# Patient Record
Sex: FEMALE | Race: WHITE | Employment: OTHER | URBAN - METROPOLITAN AREA
[De-identification: names, ages, dates, MRNs, and addresses within clinical notes are randomized per-mention and may not be internally consistent; named-entity substitution may affect disease eponyms.]

---

## 2017-04-20 ENCOUNTER — HOSPITAL ENCOUNTER (OUTPATIENT)
Dept: MAMMOGRAPHY | Age: 72
Discharge: HOME | End: 2017-04-20
Attending: FAMILY MEDICINE
Payer: MEDICARE

## 2017-04-20 DIAGNOSIS — Z78.0 POSTMENOPAUSAL: ICD-10-CM

## 2017-04-20 PROCEDURE — 77080 DXA BONE DENSITY AXIAL: CPT

## 2020-09-08 PROBLEM — Z80.8 FAMILY HISTORY OF THYROID CANCER: Status: ACTIVE | Noted: 2020-09-08

## 2021-01-25 ENCOUNTER — HOSPITAL ENCOUNTER (OUTPATIENT)
Dept: SURGERY | Age: 76
Discharge: HOME | End: 2021-01-25

## 2021-01-27 VITALS — WEIGHT: 170 LBS | HEIGHT: 64 IN | BODY MASS INDEX: 29.02 KG/M2

## 2021-01-27 NOTE — PERIOP NOTES
Patient verified name, , and procedure. Type: 1a; abbreviated assessment per anesthesia guidelines  Labs per surgeon: none  Labs per anesthesia: none    Patient COVID test date- unknown- no appt in EMR; Patient states she was not given an appt. The testing center is located at the [address]. If appointment is needed- patient provided telephone number of [phone]- pt states she will call today. Pt with reported hx of afib and MVP- most recent [facility]iology consult note states:   - Echo - normal SF, impaired relaxation, NORMAL MV, no MVP, no valvular regurgitation   - Echo report from [facility] in [place] - normal echocardiogram, no MVP  Normal nuclear stress test    Palpitations related to benign isolated ectopy well controlled with BB, no evidence of MVP and no reports of atrial fib. Patient notes the doctor who \"diagnosed\" afib never did any testing and barely even examined her. We have requested those records but records from her second opinion are reviewed, she had a pretty extensive evaluation there. Instructed pt that they will be notified by the doctor office for time of arrival to GI lab. If any questions please call the GI lab at [phone]. Follow diet and prep instructions per office. May have clear liquids until 4 hours prior to time of arrival.    [place] Roxo or shower the night before and the am of surgery with antibacterial soap. No lotions, oils, powders, cologne on skin. No make up, eye make up or jewelry. Wear loose fitting comfortable, clean clothing. Must have adult present in building the entire time . Medications for the day of procedure- celexa, bentyl PRN, synthroid, metoprolol, prilosec, imitrex PRN. Pt to hold mobic x 5 days prior to surgery.      The following discharge instructions reviewed with patient: medication given during procedure may cause drowsiness for several hours, therefore, do not drive or operate machinery for remainder of the day, no alcohol on the day of your procedure, resume regular diet and activity unless otherwise directed, for mild sore throat you may use Cepacol throat lozenges or warm salt water gargles as needed, call your physician for any problems or questions. Patient verbalizes understanding.

## 2021-02-13 ENCOUNTER — ANESTHESIA EVENT (OUTPATIENT)
Dept: ENDOSCOPY | Age: 76
End: 2021-02-13
Payer: MEDICARE

## 2021-02-13 RX ORDER — FENTANYL CITRATE 50 UG/ML
100 INJECTION, SOLUTION INTRAMUSCULAR; INTRAVENOUS AS NEEDED
Status: CANCELLED | OUTPATIENT
Start: 2021-02-13

## 2021-02-13 RX ORDER — MIDAZOLAM HYDROCHLORIDE 1 MG/ML
2 INJECTION, SOLUTION INTRAMUSCULAR; INTRAVENOUS
Status: CANCELLED | OUTPATIENT
Start: 2021-02-13 | End: 2021-02-14

## 2021-02-15 ENCOUNTER — HOSPITAL ENCOUNTER (OUTPATIENT)
Age: 76
Setting detail: OUTPATIENT SURGERY
Discharge: HOME | End: 2021-02-15
Attending: SURGERY | Admitting: SURGERY
Payer: MEDICARE

## 2021-02-15 ENCOUNTER — ANESTHESIA (OUTPATIENT)
Dept: ENDOSCOPY | Age: 76
End: 2021-02-15
Payer: MEDICARE

## 2021-02-15 VITALS
RESPIRATION RATE: 16 BRPM | HEART RATE: 80 BPM | TEMPERATURE: 98.6 F | BODY MASS INDEX: 29.59 KG/M2 | DIASTOLIC BLOOD PRESSURE: 59 MMHG | WEIGHT: 173.3 LBS | HEIGHT: 64 IN | OXYGEN SATURATION: 93 % | SYSTOLIC BLOOD PRESSURE: 130 MMHG

## 2021-02-15 DIAGNOSIS — K64.8 INTERNAL AND EXTERNAL HEMORRHOIDS WITHOUT COMPLICATION: ICD-10-CM

## 2021-02-15 DIAGNOSIS — K64.4 INTERNAL AND EXTERNAL HEMORRHOIDS WITHOUT COMPLICATION: ICD-10-CM

## 2021-02-15 DIAGNOSIS — K21.9 GASTROESOPHAGEAL REFLUX DISEASE WITHOUT ESOPHAGITIS: ICD-10-CM

## 2021-02-15 DIAGNOSIS — K57.30 DIVERTICULOSIS OF COLON (WITHOUT MENTION OF HEMORRHAGE): ICD-10-CM

## 2021-02-15 PROCEDURE — 74011250636 HC RX REV CODE- 250/636: Performed by: ANESTHESIOLOGY

## 2021-02-15 PROCEDURE — 88305 TISSUE EXAM BY PATHOLOGIST: CPT

## 2021-02-15 PROCEDURE — 87081 CULTURE SCREEN ONLY: CPT

## 2021-02-15 PROCEDURE — 76040000026: Performed by: SURGERY

## 2021-02-15 PROCEDURE — 76060000032 HC ANESTHESIA 0.5 TO 1 HR: Performed by: SURGERY

## 2021-02-15 PROCEDURE — 45378 DIAGNOSTIC COLONOSCOPY: CPT | Performed by: SURGERY

## 2021-02-15 PROCEDURE — 74011250637 HC RX REV CODE- 250/637: Performed by: ANESTHESIOLOGY

## 2021-02-15 PROCEDURE — 74011000250 HC RX REV CODE- 250: Performed by: NURSE ANESTHETIST, CERTIFIED REGISTERED

## 2021-02-15 PROCEDURE — 77030021593 HC FCPS BIOP ENDOSC BSC -A: Performed by: SURGERY

## 2021-02-15 PROCEDURE — 74011000250 HC RX REV CODE- 250: Performed by: ANESTHESIOLOGY

## 2021-02-15 PROCEDURE — 74011250636 HC RX REV CODE- 250/636: Performed by: NURSE ANESTHETIST, CERTIFIED REGISTERED

## 2021-02-15 PROCEDURE — 88312 SPECIAL STAINS GROUP 1: CPT

## 2021-02-15 PROCEDURE — 2709999900 HC NON-CHARGEABLE SUPPLY: Performed by: SURGERY

## 2021-02-15 PROCEDURE — 43239 EGD BIOPSY SINGLE/MULTIPLE: CPT | Performed by: SURGERY

## 2021-02-15 RX ORDER — ONDANSETRON 2 MG/ML
4 INJECTION INTRAMUSCULAR; INTRAVENOUS ONCE
Status: DISCONTINUED | OUTPATIENT
Start: 2021-02-15 | End: 2021-02-15 | Stop reason: HOSPADM

## 2021-02-15 RX ORDER — PROPOFOL 10 MG/ML
INJECTION, EMULSION INTRAVENOUS
Status: DISCONTINUED | OUTPATIENT
Start: 2021-02-15 | End: 2021-02-15 | Stop reason: HOSPADM

## 2021-02-15 RX ORDER — OXYCODONE HYDROCHLORIDE 5 MG/1
5 TABLET ORAL
Status: DISCONTINUED | OUTPATIENT
Start: 2021-02-15 | End: 2021-02-15 | Stop reason: HOSPADM

## 2021-02-15 RX ORDER — PROPOFOL 10 MG/ML
INJECTION, EMULSION INTRAVENOUS AS NEEDED
Status: DISCONTINUED | OUTPATIENT
Start: 2021-02-15 | End: 2021-02-15 | Stop reason: HOSPADM

## 2021-02-15 RX ORDER — METOPROLOL SUCCINATE 25 MG/1
50 TABLET, EXTENDED RELEASE ORAL ONCE
Status: COMPLETED | OUTPATIENT
Start: 2021-02-15 | End: 2021-02-15

## 2021-02-15 RX ORDER — NALOXONE HYDROCHLORIDE 0.4 MG/ML
0.1 INJECTION, SOLUTION INTRAMUSCULAR; INTRAVENOUS; SUBCUTANEOUS AS NEEDED
Status: DISCONTINUED | OUTPATIENT
Start: 2021-02-15 | End: 2021-02-15 | Stop reason: HOSPADM

## 2021-02-15 RX ORDER — ONDANSETRON 2 MG/ML
INJECTION INTRAMUSCULAR; INTRAVENOUS AS NEEDED
Status: DISCONTINUED | OUTPATIENT
Start: 2021-02-15 | End: 2021-02-15 | Stop reason: HOSPADM

## 2021-02-15 RX ORDER — OXYCODONE HYDROCHLORIDE 5 MG/1
10 TABLET ORAL
Status: DISCONTINUED | OUTPATIENT
Start: 2021-02-15 | End: 2021-02-15 | Stop reason: HOSPADM

## 2021-02-15 RX ORDER — LIDOCAINE HYDROCHLORIDE 10 MG/ML
0.1 INJECTION INFILTRATION; PERINEURAL AS NEEDED
Status: DISCONTINUED | OUTPATIENT
Start: 2021-02-15 | End: 2021-02-15 | Stop reason: HOSPADM

## 2021-02-15 RX ORDER — HYDROMORPHONE HYDROCHLORIDE 2 MG/ML
0.5 INJECTION, SOLUTION INTRAMUSCULAR; INTRAVENOUS; SUBCUTANEOUS
Status: DISCONTINUED | OUTPATIENT
Start: 2021-02-15 | End: 2021-02-15 | Stop reason: HOSPADM

## 2021-02-15 RX ORDER — PANTOPRAZOLE SODIUM 40 MG/1
40 TABLET, DELAYED RELEASE ORAL 2 TIMES DAILY
Qty: 84 TAB | Refills: 0 | Status: SHIPPED | OUTPATIENT
Start: 2021-02-15 | End: 2021-04-05

## 2021-02-15 RX ORDER — SODIUM CHLORIDE, SODIUM LACTATE, POTASSIUM CHLORIDE, CALCIUM CHLORIDE 600; 310; 30; 20 MG/100ML; MG/100ML; MG/100ML; MG/100ML
75 INJECTION, SOLUTION INTRAVENOUS CONTINUOUS
Status: DISCONTINUED | OUTPATIENT
Start: 2021-02-15 | End: 2021-02-15 | Stop reason: HOSPADM

## 2021-02-15 RX ORDER — SODIUM CHLORIDE, SODIUM LACTATE, POTASSIUM CHLORIDE, CALCIUM CHLORIDE 600; 310; 30; 20 MG/100ML; MG/100ML; MG/100ML; MG/100ML
1000 INJECTION, SOLUTION INTRAVENOUS CONTINUOUS
Status: DISCONTINUED | OUTPATIENT
Start: 2021-02-15 | End: 2021-02-15 | Stop reason: HOSPADM

## 2021-02-15 RX ORDER — LIDOCAINE HYDROCHLORIDE 20 MG/ML
INJECTION, SOLUTION EPIDURAL; INFILTRATION; INTRACAUDAL; PERINEURAL AS NEEDED
Status: DISCONTINUED | OUTPATIENT
Start: 2021-02-15 | End: 2021-02-15 | Stop reason: HOSPADM

## 2021-02-15 RX ADMIN — LIDOCAINE HYDROCHLORIDE 0.1 ML: 10 INJECTION, SOLUTION INFILTRATION; PERINEURAL at 07:29

## 2021-02-15 RX ADMIN — ONDANSETRON 4 MG: 2 INJECTION INTRAMUSCULAR; INTRAVENOUS at 08:22

## 2021-02-15 RX ADMIN — LIDOCAINE HYDROCHLORIDE 60 MG: 20 INJECTION, SOLUTION EPIDURAL; INFILTRATION; INTRACAUDAL; PERINEURAL at 07:52

## 2021-02-15 RX ADMIN — SODIUM CHLORIDE, SODIUM LACTATE, POTASSIUM CHLORIDE, AND CALCIUM CHLORIDE: 600; 310; 30; 20 INJECTION, SOLUTION INTRAVENOUS at 08:05

## 2021-02-15 RX ADMIN — PROPOFOL 70 MG: 10 INJECTION, EMULSION INTRAVENOUS at 07:52

## 2021-02-15 RX ADMIN — METOPROLOL SUCCINATE 50 MG: 25 TABLET, EXTENDED RELEASE ORAL at 07:43

## 2021-02-15 RX ADMIN — SODIUM CHLORIDE, SODIUM LACTATE, POTASSIUM CHLORIDE, AND CALCIUM CHLORIDE 500 ML: 600; 310; 30; 20 INJECTION, SOLUTION INTRAVENOUS at 07:30

## 2021-02-15 RX ADMIN — PROPOFOL 200 MCG/KG/MIN: 10 INJECTION, EMULSION INTRAVENOUS at 07:52

## 2021-02-15 NOTE — OP NOTES
Esophagogastroduodenoscopy and Colonoscopy Procedure Note    Date of procedure: 2/15/2021      Name: Lubna Lindquist      MRN: [id number]       : 1945       Age: 70 y.o. Sex: female      Preoperative Diagnosis: 1. GERD      2. Regurgitation      3. History of polyps    Postoperative Diagnosis: 1. same      2. Sliding hiatal hernia      3. Gastritis with antral erosions      4. Diverticulosis      5. Internal and External hemorrhoids    Procedure(s):  ESOPHAGOGASTRODUEDENOSCOPY  COLONOSCOPY/ BMI 30  ESOPHAGOGASTRODUODENAL (EGD) BIOPSY [facility] [address], [id number]    EGD Procedure in Detail:  Informed consent was obtained for the procedure, the patient was brought to the endoscopy suite and placed in left lateral decubitus position. Sedation was induced by anesthesia. The [id number] gastroscope was inserted into the mouth and advanced under direct vision to second portion of the duodenum. A careful inspection was made as the gastroscope was withdrawn, including a retroflexed view of the proximal stomach; findings and interventions are described below, with appropriate photodocumentation obtained. Findings:   Oropharynx:   Normal  Esophagus:       - Sliding hiatal hernia 2-3 cm  GEJ 37 cm  Cardia of the stomach:    Normal  Body of the stomach:      - Flat lesions:     - mild gastritis  Fundus of the stomach:    Normal  Antrum of the stomach:      - Excavated lesions:     - Erosions    - Flat lesions:     - moderate gastritis  Duodenum:    Normal        Therapies:      biopsy of stomach body, antrum, pre pyloric antrum    EBL-  minimal    Specimens: Specimens were collected and sent to pathology.   ID Type Source Tests Collected by Time Destination   1 : gastric bxs Preservative   Mildred Ro MD 2/15/2021 0801 Pathology   1 : jose raul test Tissue Gastric H PYLORI, TISSUE (LAB) Mildred Ro MD 2/15/2021 8774 Microbiology               Complications:   None; patient tolerated the procedure well.           Recommendations:  - Acid suppression with a proton pump inhibitor.  - Await pathology. - Await JOSE RAUL test result and treat for Helicobacter pylori if positive. Colonoscopy Procedure in Detail:  The bed was turn around and patient continued in left lateral decubitus position. The [id number] was inserted into the rectum and advanced under direct vision to the cecum, which was identified by the ileocecal valve and appendiceal orifice. The quality of the colonic preparation was good. A careful inspection was made as the colonoscope was withdrawn, including a retroflexed view of the rectum; findings and interventions are described below. Appropriate photodocumentation was obtained. Findings:   Rectum:     - Protruding lesions:     -External Hemorrhoids and     -Internal Hemorrhoids  Sigmoid:     - Excavated lesions:     - Diverticulosis  Descending Colon:     - Excavated lesions:     - Diverticulosis  Transverse Colon:   Normal  Ascending Colon:   Normal  Cecum:   Normal  Terminal Ileum:   Not entered    Specimens: No specimens were collected. ID Type Source Tests Collected by Time Destination   1 : gastric bxs Preservative   Mahad Mensah MD 2/15/2021 0801 Pathology   1 : jose raul test Tissue Gastric H PYLORI, TISSUE (LAB) Mahad Mensah MD 2/15/2021 6728 Microbiology        Complications: None; patient tolerated the procedure well. EBL - minimal    Recommendations:   - Repeat colonoscopy in 5 years.      Signed By: Moises Brower MD  [address] [facility]  2/15/2021 8:25 AM

## 2021-02-15 NOTE — DISCHARGE INSTRUCTIONS
Gastrointestinal Colonoscopy/Flexible Sigmoidoscopy - Lower Exam Discharge Instructions  1. Call Dr. Courtney Mills at office for any problems or questions. 2. Contact the doctors office for follow up appointment as directed  3. Medication may cause drowsiness for several hours, therefore:  · Do not drive or operate machinery for reminder of the day. · No alcohol today. · Do not make any important or legal decisions for 24 hours. · Do not sign any legal documents for 24 hours. 4. Ordinarily, you may resume regular diet and activity after exam unless otherwise specified by your physician. 5. Because of air put into your colon during exam, you may experience some abdominal distension, relieved by the passage of gas, for several hours. 6. Contact your physician if you have any of the following:  · Excessive amount of bleeding - large amount when having a bowel movement. Occasional specks of blood with bowel movement would not be unusual.  · Severe abdominal pain  · Fever or Chills    Any additional instructions:  ***      Instructions given to Maisha Martin and other family members. Gastrointestinal Esophagogastroduodenoscopy (EGD) - Upper Exam Discharge Instructions    1. Call Dr. Courtney Mills at office for any problems or questions. 2. Contact the doctor's office for follow up appointment as directed. 3. Medication may cause drowsiness for several hours, therefore:  · Do not drive or operate machinery for remainder of the day. · No alcohol today. · Do not make any important or legal decisions for 24 hours. · Do not sign any legal documents for 24 hours. 5. Ordinarily, you may resume regular diet and activity after exam unless otherwise specified by your physician. 6. For mild soreness in your throat you may use Cepacol throat lozenges or warm salt-water gargles as needed. Any additional instructions:  ***     Instructions given to Maisha Martin and other family members.

## 2021-02-15 NOTE — ANESTHESIA PREPROCEDURE EVALUATION
Relevant Problems   NEUROLOGY   (+) Depression with anxiety      CARDIOVASCULAR   (+) Essential hypertension      GASTROINTESTINAL   (+) Esophageal reflux   (+) GERD (gastroesophageal reflux disease)      ENDOCRINE   (+) Primary hypothyroidism       Anesthetic History               Review of Systems / Medical History  Patient summary reviewed and pertinent labs reviewed    Pulmonary  Within defined limits                 Neuro/Psych         Headaches and psychiatric history     Cardiovascular    Hypertension        Dysrhythmias : atrial fibrillation           GI/Hepatic/Renal     GERD           Endo/Other      Hypothyroidism       Other Findings              Physical Exam    Airway  Mallampati: II  TM Distance: 4 - 6 cm  Neck ROM: normal range of motion   Mouth opening: Normal     Cardiovascular    Rhythm: regular  Rate: normal         Dental  No notable dental hx       Pulmonary  Breath sounds clear to auscultation               Abdominal  GI exam deferred       Other Findings            Anesthetic Plan    ASA: 2  Anesthesia type: total IV anesthesia            Anesthetic plan and risks discussed with: Patient

## 2021-02-15 NOTE — H&P
Axel De MD   Bariatric & Advanced Laparoscopic Surgery & Endoscopy  [address] [place] [address]  Gautam Strauss  Phone [phone]   Fax [phone]      Date of visit: 2/15/2021      Primary/Requesting provider: Mary Ann Pierre MD         Name: Raghu Jose      MRN: [id number]       : 1945       Age: 70 y.o. Sex: female        PCP: Mary Ann Pierre MD     CC:    No chief complaint on file. HPI:     Raghu Jose is a 70 y.o. female was referred here to our clinic for evaluation for an EGD/colonoscopy by PCP. Last colonoscopy was in 2016 and was found with 6 polyps. She reports occasional diarrhea for the last 8 years. She has history of gastric ulcer and GERD about 20 years ago. She takes Nexium daily. She is gluten and lactose intolerant. She repors regurgitation but denies dysphagia. No smoking. GERD YES      Dysphagia NO   Regurgitation YES   Nausea NO   Vomiting NO   Upper abdominal pain NO          Family hx of colon cancer NO      Previous colonoscopy YES   BRBPR NO   Melena NO   Loss of appetite NO   Weight loss NO      Change in bowel habits NO       PMH:    Past Medical History:   Diagnosis Date    Atrial fibrillation ([address]     per cardio note 2020: \"Palpitations related to benign isolated ectopy well controlled with BB, no evidence of MVP and no reports of atrial fib. \"'    Depression with anxiety     Diverticulosis of colon     Essential hypertension     GERD (gastroesophageal reflux disease)     Hashimoto's thyroiditis     Hypercholesterolemia     Migraine     Mitral valve prolapse     2019 Echo report from [facility] in [place] - \"normal echocardiogram, no MVP\"    Multiple thyroid nodules     Peptic ulcer of stomach     Postmenopausal     Primary hypothyroidism     Spider telangiectasis        PSH:    Past Surgical History:   Procedure Laterality Date    HX APPENDECTOMY  age 27   [employer] BREAST BIOPSY      HX BREAST RECONSTRUCTION Bilateral 1990    HX CHOLECYSTECTOMY  2008    HX COLONOSCOPY      HX ENDOSCOPY  3/2016    HX HEENT  06/2014    sinus surgery    HX HERNIA REPAIR  age 41    HX HYSTERECTOMY  age 30       MEDS:    Current Facility-Administered Medications   Medication    lidocaine (XYLOCAINE) 10 mg/mL (1 %) injection 0.1 mL    lactated Ringers infusion 1,000 mL       ALLERGIES:      Allergies   Allergen Reactions    Gluten Nausea Only    Milk Containing Products Nausea Only       SH:    Social History     Tobacco Use    Smoking status: Never Smoker    Smokeless tobacco: Never Used   Substance Use Topics    Alcohol use: No    Drug use: No       FH:    Family History   Problem Relation Age of Onset    Lung Cancer Father     Broken Bones Mother     Heart Disease Mother     Breast Cancer Maternal Cousin     Breast Cancer Maternal Aunt     Thyroid Cancer Maternal Cousin        Review of systems:  Review of Systems   Constitutional: Positive for malaise/fatigue. Negative for chills, fever and weight loss. HENT: Negative for sore throat. Eyes: Negative for discharge and redness. Respiratory: Negative for cough, hemoptysis, sputum production, shortness of breath and stridor. Cardiovascular: Negative. Negative for chest pain, palpitations, orthopnea, claudication, leg swelling and PND. Gastrointestinal: Positive for abdominal pain and diarrhea. Negative for blood in stool, constipation, heartburn, melena, nausea and vomiting. Genitourinary: Negative for dysuria and hematuria. Musculoskeletal: Negative for back pain, falls and joint pain. Skin: Negative for itching and rash. Neurological: Negative for sensory change, speech change, focal weakness, loss of consciousness and headaches. Endo/Heme/Allergies: Does not bruise/bleed easily. Psychiatric/Behavioral: Negative. Negative for depression, hallucinations, memory loss, substance abuse and suicidal ideas.  The patient is not nervous/anxious and does not have insomnia. Physical Exam:     Visit Vitals  /89   Pulse 87   Temp 98.3 °F (36.8 °C)   Resp 16   Ht 5' 4\" (1.626 m)   Wt 173 lb 4.8 oz (78.6 kg)   SpO2 96%   BMI 29.75 kg/m²       General:  Well-developed, well-nourished, no distress. Psych:  Cooperative, good insight and judgement. Neuro:  Alert, oriented to person, place and time. HEENT:  Normocephalic, atraumatic. Sclera clear. Lungs:  Unlabored breathing. Symmetrical chest expansion. Chest wall:  No tenderness or deformity. Heart:  Regular rate and rhythm. No JVD. Abdomen:  Soft, non-tender, non-distended. No palpable masses. Extremities:  Extremities normal, atraumatic, no cyanosis or edema. Skin:  Skin color, texture, turgor normal. No rashes. Labs: All recent labs were reviewed. Normal WBC. Normal CBC. Normal lytes. Assessment:  Kaleb Christie is a 70 y.o. female was referred here for an EGD/colonoscopy. Recommendations:     1. Schedule for EGD/colonoscopy. The risks, benefits, alternatives, and consequences were reviewed with the patient, who expressed verbal understanding and agreement to proceed.          Signed: Elliot Nolen MD  Bariatric & Minimally Invasive Surgery  [facility]  2/15/2021

## 2021-02-15 NOTE — ANESTHESIA POSTPROCEDURE EVALUATION
Procedure(s):  ESOPHAGOGASTRODUEDENOSCOPY  COLONOSCOPY/ BMI 30  ESOPHAGOGASTRODUODENAL (EGD) BIOPSY.     total IV anesthesia    Anesthesia Post Evaluation      Multimodal analgesia: multimodal analgesia used between 6 hours prior to anesthesia start to PACU discharge  Patient location during evaluation: bedside  Patient participation: complete - patient participated  Level of consciousness: responsive to verbal stimuli  Pain management: adequate  Airway patency: patent  Anesthetic complications: no  Cardiovascular status: hemodynamically stable  Respiratory status: spontaneous ventilation  Hydration status: stable    Final Post Anesthesia Temperature Assessment:  Normothermia (36.0-37.5 degrees C)      INITIAL Post-op Vital signs:   Vitals Value Taken Time   /59 02/15/21 0844   Temp 37 °C (98.6 °F) 02/15/21 0829   Pulse 80 02/15/21 0844   Resp 16 02/15/21 0844   SpO2 93 % 02/15/21 0844

## 2021-02-16 LAB
H PYLORI AG TISS QL IMSTN: NEGATIVE
H PYLORI AG TISS QL IMSTN: NEGATIVE

## 2021-02-17 NOTE — PROGRESS NOTES
Please call patient about pathology results. H pylori was negative. PPI sent x 6 weeks for gastritis. No colon polyps. Colon recall in 5 years due to previous history of polyps.      Mir Oconnell MD  Bariatric & Minimally Invasive Surgery  [facility]  2/17/2021 7:52 AM

## 2021-07-15 PROBLEM — N39.3 STRESS INCONTINENCE: Status: ACTIVE | Noted: 2021-07-15

## 2021-07-15 PROBLEM — N95.2 VAGINAL ATROPHY: Status: ACTIVE | Noted: 2021-07-15

## 2021-07-15 PROBLEM — N81.10 VAGINAL PROLAPSE: Status: ACTIVE | Noted: 2021-07-15

## 2021-08-18 ENCOUNTER — HOSPITAL ENCOUNTER (OUTPATIENT)
Dept: PHYSICAL THERAPY | Age: 76
Discharge: HOME | End: 2021-08-18
Payer: MEDICARE

## 2021-08-18 DIAGNOSIS — N39.3 STRESS INCONTINENCE: ICD-10-CM

## 2021-08-18 PROCEDURE — 97161 PT EVAL LOW COMPLEX 20 MIN: CPT

## 2021-08-18 PROCEDURE — 97110 THERAPEUTIC EXERCISES: CPT

## 2021-08-18 NOTE — PROGRESS NOTES
Ray Aiken  : 1945  [place]  Secondary: [address] at [facility]  [address], [address], [place] [address]  [phone]   [phone]      OUTPATIENT PHYSICAL THERAPY: Daily Treatment Note 2021   Visit Count:  1  ICD-10: Treatment Diagnosis: Lack of coordination (muscle incoordination) (R27.8), Generalized weakness (M62.81), Mixed incontinence (Urge and stress incontinence) (N39.46) and Frequency of micturition (R35.0)  Precautions/Allergies:   Gluten and Milk containing products   TREATMENT PLAN:  Effective Dates: 2021 TO 2021 (90 days). Frequency/Duration: 1 time a week for 90 Day(s) MEDICAL/REFERRING DIAGNOSIS:  Stress incontinence [N39.3]   DATE OF ONSET: 3+ years  REFERRING PHYSICIAN: Laith Jj DO  MD Orders: Evaluate and treat  Return MD Appointment: Not scheduled/after PT     Pre-treatment Symptoms/Complaints:  JENNIFER  Pain: Initial: Pain Intensity 1: 0  Post Session:  0/10   Medications Last Reviewed:  2021  Updated Objective Findings:  See evaluation note from today   TREATMENT:   THERAPEUTIC EXERCISE: (10 minutes):  Exercises per grid below to improve strength and coordination. Required maximal verbal and tactile cues to promote proper body breathing techniques and minimze accessory muscle use. Progressed range and repetitions as indicated. TE= therapeutic exercise, TA= therapeutic activity, MT= manual therapy, NE= neuro re-education   Date:  21 Date:   Date:     Activity/Exercise Parameters Parameters Parameters   PFM coordination/strength Isolation of contractions with maintaining normal breathing     HEP 2H/5R x10, 3x/day             THERAPEUTIC ACTIVITY: ( 10 minutes): Functional activity education regarding anatomy, pathology and role of pelvic floor muscle (PFM) function in relation to presenting symptoms and role of pelvic floor therapy in conservative treatment.   TA Educational Topic Notes Date Completed   Pathology/Anatomy/PFM Function  8/18/21   Bladder health education     Urinary urge suppression     The knack     Voiding strategies     Bowel/Bladder log     Bowel health education     Constipation care     Diarrhea/Fecal leakage     Colonic massage     Toilet positioning     Defecation dynamics     Sources of fiber     Return to intercourse/Dilator training     Sexual positioning     Lubricants/vaginal moisturizers     Vaginal irritants     Body mechanics     Posture/ergonomics     Diaphragmatic breathing     Resources and technology       Pt gives verbal consent to internal vaginal assessment/treatment without chaperon present. Treatment/Session Summary:    · Response to Treatment:  Pt reports good understanding of plan of care, as well as prescribed home exercise program.  All questions were answered to pt's satisfaction. Pt was invited to call with any further questions or concerns. · Communication/Consultation:  None today  · Equipment provided today:  None today  · Recommendations/Intent for next treatment session: Next visit will focus on bladder health education, coordination, progression with quick contractions.   · Variation from POC: pt OOT 9/16-9/24  Total Treatment Billable Duration: Evaluation 30 minutes, treatment 20 minutes (10 minutes TE, 10 minutes TA)  PT Patient Time In/Time Out  Time In: 9368  Time Out: Nagi Wong Nix, PT, DPT     Future Appointments   Date Time Provider You Gillisi   8/25/2021  2:00 PM Chaz Eisenberg PT, DPT [facility]   9/1/2021  2:00 PM Chaz Eisenberg PT, DPT [facility]   9/8/2021 11:00 AM [facility]   9/8/2021  1:30 PM Yelena Mcneil MD END [facility]   9/15/2021 11:00 AM Chaz Eisenberg PT, DPT [facility]   9/29/2021 11:00 AM [facility]   10/6/2021 11:00 AM [facility]   12/3/2021  9:00 AM [facility]   12/10/2021  9:20 AM Naheed Meredith MD [facility]

## 2021-08-18 NOTE — THERAPY EVALUATION
Ki Valentin  : 1945  [place]  Secondary: [address] [facility], [address], [place] [address]  [phone]   [phone]        OUTPATIENT PHYSICAL THERAPY:Initial Assessment 2021   ICD-10: Treatment Diagnosis: Lack of coordination (muscle incoordination) (R27.8), Generalized weakness (M62.81), Mixed incontinence (Urge and stress incontinence) (N39.46) and Frequency of micturition (R35.0)  Precautions/Allergies:   Gluten and Milk containing products   TREATMENT PLAN:  Effective Dates: 2021 TO 2021 (90 days). Frequency/Duration: 1 time a week for 90 Day(s) MEDICAL/REFERRING DIAGNOSIS:  Stress incontinence [N39.3]   DATE OF ONSET: 3+ years  REFERRING PHYSICIAN: Gregg Agarwal DO  MD Orders: Evaluate and treat  Return MD Appointment: Not scheduled/after PT     INITIAL ASSESSMENT: Ki Valentin presents with musculoskeletal limitations including pelvic floor muscle (PFM) tension, reduced coordination and awareness of PFM, reduced hip strength, poor diaphragm excursion and decreased pelvic floor muscle (PFM) strength. These limitations are impairing the patient's ability to properly coordinate perineal elevation and descent for normalized PFM function, contributing to urinary dysfunction, including mixed urinary dysfunction and frequency of urination. As a result, she is limited in her/his ability to participate in maintaining normal bladder control throughout the day with urgency and activities with coughing, laughing sneezing and sit to stand transitions. PROBLEM LIST (Impacting functional limitations):  1. Decreased Strength  2. Decreased ADL/Functional Activities  3. Decreased Flexibility/Joint Mobility  4. Decreased [place] with Home Exercise Program  5.  Decreased strength of pelvic floor which limits bladder control INTERVENTIONS PLANNED: (Treatment may consist of any combination of the following)  1. Neuromuscular re-education  2. Biofeedback as needed  3. HEP  4. Bladder retraining  5. Bladder education  6. Bed Mobility  7. Electrical Stimulation  8. Home Exercise Program (HEP)  9. Manual Therapy  10. Neuromuscular Re-education/Strengthening  11. Therapeutic Activites  12. Therapeutic Exercise/Strengthening     GOALS: (Goals have been discussed and agreed upon with patient.)  Short-Term Functional Goals: Time Frame: 6 weeks  1. Pt will demonstrate I with basic PFM HEP to improve awareness, coordination, and timing of PFM. 2. Pt will demonstrate understanding of and ability to teach back two strategies to reduce constipation and improve toileting to minimize strain on the pelvic floor muscles. 3. Pt will demonstrate understanding of and ability to teach back appropriate water intake, bladder irritants, toileting frequency, and positioning for improved self-management of symptoms. 4. Pt will demonstrate ability to isolate a pelvic floor contraction without breath holding and minimal to no accessory muscle use in order to implement with the knack and/or urge suppression in order to reduce UI episodes by 50%. 5. Pt will demonstrate ability to perform diaphragmatic breathing in multiple positions to assist in pelvic floor tension reduction. Discharge Goals: Time Frame: 12 weeks  1. Pt will improve outcome score by 12%. 2. Pt will demonstrate ability to voluntarily contract the pelvic floor muscles prior to a cough or sneeze for decreased leakage during increases in intra-abdominal pressure. 3. Pt will be able to perform sit to stand without urinary leakage for improved participation in recreational activities and ADL's.   4. Pt will demonstrate independence with an advanced HEP for general conditioning, core stabilization, and mobility to facilitate carry over and independent management of symptoms.    5. Pt will be independent in implementation of a timed voiding schedule and use of urge suppression to reduce urinary frequency to 8/day and <2/night. OUTCOME MEASURE:   Tool Used: Pelvic Floor Impact Questionnaireshort form 7 (PFIQ-7)   Score:  Initial: 8/18/21  · Bladder or Urine: 28.57/100  · Bowel or Rectum: 0/100  · Vagina or Pelvis: 0/100 Most Recent: X (Date: -- )  · Bladder or Urine: X  · Bowel or Rectum: X  · Vagina or Pelvis: X   Interpretation of Score: Each of the 7 sections is scored on a scale from 0-3; 0 representing \"Not at all\", 3 representing \"Quite a bit\". The mean value is taken from all the answered items, then multiplied by 100 to obtain the scale score, ranging from 0-100. This process is repeated for each column representing bowel, bladder, and pelvic pain. MEDICAL NECESSITY:   · Patient is expected to demonstrate progress in strength, range of motion, coordination and functional technique to improve bladder control and reduce UI. REASON FOR SERVICES/OTHER COMMENTS:  · Patient continues to require skilled intervention due to above mentioned deficits. Total Duration:  PT Patient Time In/Time Out  Time In: 2657  Time Out: 1000    Rehabilitation Potential For Stated Goals: Good  Regarding Kendallcassie Danacassie Zakia's therapy, I certify that the treatment plan above will be carried out by a therapist or under their direction. Thank you for this referral,  Tez Mendieta, PT, DPT        PAIN/SUBJECTIVE:   Initial: Pain Intensity 1: 0  Post Session:  0/10   HISTORY:   History of Injury/Illness (Reason for Referral):  Ms. Margarita Nunez is a 71 yo female referred to pelvic floor physical therapy (PFPT) by Juana Peters,  2/2 stress urinary incontinence (JENNIFER). Pt reports that symptoms began 3+ years ago. Previous treatment includes nothing. Started topical estrogen about 3 weeks ago. And this has helped a lot. No longer having burning with she urinates, \"blisters\" are gone now. Pt with urinary leakage with coughing, laughing, sneezing.  She does leakage with urgency. Does have more control since starting topicals. Has also tried to drink mostly water. Urges are less intense. She is not wearing pads any longer. Urinary: Frequency 10 x/day, 2 x/night. Positive for stress urinary incontinence, urge urinary incontinence and urinary frequency. Negative for urinary urgency, incomplete emptying, urinary hesitancy/intermittency, dysuria, hematuria, nocturia and nocturnal enuresis. Pt does not use pads for urinary protection; 0 pads per day (PPD). She stopped wearing pads because of tissue irritation. \"I'm very bathroom conscious\". She does change her underwear 4x/week. Fluid intake: 48 oz water/day; bladder irritants include: 1c coffee. Pt does limit fluid intake due to bladder control, mostly at night time. Stops drinking around 6pm.    Bowel: Frequency daily. She does splint to evacuate bowels. Negative for pain with bowel movement, pushing/straining with bowel movement, fecal incontinence, constipation and incomplete emptying. Pt does not use pads for bowel protection; 0 pads per day (PPD). Use of stool softeners or laxatives? NO; takes Nury probiotic. She does have a history of diverticulitis. Sexual: Pt is not sexually active. Pelvic Organ Prolapse/Pelvic Pain: Denies pelvic pain, pressure, vaginal pain, bladder pain or rectal pain. OB History: Number of pregnancies: 2 Number of vaginal deliveries: 2 Number of c-sections: 0. Episiotomy x2    Patient stated goals for PT:  Patients goal(s) for PT are strengthening muscles in order to have better bladder control.     Past Medical History/Comorbidities:   Ms. César Fernando  has a past medical history of Atrial fibrillation ([facility] [address] Depression with anxiety, Diverticulosis of colon, Essential hypertension, GERD (gastroesophageal reflux disease), Hashimoto's thyroiditis, Hypercholesterolemia, Migraine, Mitral valve prolapse, Multiple thyroid nodules, Peptic ulcer of stomach, Postmenopausal, Primary hypothyroidism, and Spider telangiectasis. Ms. Terri Villagomez  has a past surgical history that includes hx breast reconstruction (Bilateral, 1990); hx hysterectomy (age 30); hx appendectomy (age 27); hx cholecystectomy (2008); hx hernia repair (age 41); hx endoscopy (3/2016); hx heent (06/2014); hx breast biopsy; hx colonoscopy; and colonoscopy (N/A, 2/15/2021). Social History/Living Environment:   Have you ever had any pelvic trauma (orthopedic in nature, fall, MVA, etc.)? NO   Have you ever experienced any unwanted physical or sexual contact? NO   Have you ever experienced any form of medical trauma (GYN, urological, GI, etc)? NO     Pt lives with her . Alcohol use? No  Tobacco use? No    Prior Level of Function/Work/Activity:  Prior level of function: Independent  Occupation: Part time, accounting  Exercise activities: None        Risk Factors:       (1)  Altered Elimination Ability to Rise from Chair:       (1)  Pushes up, successful in one attempt   Falls Prevention Plan:       No modifications necessary   Total: (5 or greater = High Risk): 2   ©2010 [facility] [address] Patent #[id number]. Federal Law prohibits the replication, distribution or use without written permission from [facility] [employer]   Current Medications:       Current Outpatient Medications:     OTHER, Estradiol 0.1mg + vitamin E 100IU, 30 grams Insert 0.5g into the vagina 2-3 times weekly  Compounding solutions, Disp: 30 g, Rfl: 5    pantoprazole (PROTONIX) 40 mg tablet, Take 1 Tablet by mouth daily. , Disp: 90 Tablet, Rfl: 3    metoprolol succinate (TOPROL-XL) 50 mg XL tablet, Take 1 Tablet by mouth daily. , Disp: 1 Tablet, Rfl: 0    hydroCHLOROthiazide (HYDRODIURIL) 25 mg tablet, Take 1 Tablet by mouth daily. , Disp: 90 Tablet, Rfl: 1    SUMAtriptan (Imitrex) 100 mg tablet, Take 1 Tablet by mouth as needed for Migraine. , Disp: 10 Tablet, Rfl: 6    folic acid/multivit-min/lutein (CENTRUM SILVER PO), Take  by mouth daily. , Disp: , Rfl:     mupirocin (BACTROBAN) 2 % ointment, Apply  to affected area daily. , Disp: 22 g, Rfl: 0    ALPRAZolam (XANAX) 1 mg tablet, Take 1 Tablet by mouth three (3) times daily as needed for Anxiety. Max Daily Amount: 3 mg., Disp: 90 Tablet, Rfl: 0    atorvastatin (LIPITOR) 20 mg tablet, Take 1 Tablet by mouth nightly for 90 days. , Disp: 9030 Tablet, Rfl: 2    levothyroxine (SYNTHROID) 50 mcg tablet, 1 tablet daily 6 days/week, Disp: 26 Tablet, Rfl: 11    meloxicam (MOBIC) 15 mg tablet, 1 tab daily, Disp: 90 Tab, Rfl: 3    citalopram (CELEXA) 20 mg tablet, Take 1 Tab by mouth daily. , Disp: 90 Tab, Rfl: 3    zonisamide (ZONEGRAN) 100 mg capsule, Take 1 Cap by mouth nightly., Disp: 90 Cap, Rfl: 3    dicyclomine (BENTYL) 20 mg tablet, Take 20 mg by mouth every six (6) hours as needed for Abdominal Cramps., Disp: , Rfl:     aspirin delayed-release 81 mg tablet, Take 81 mg by mouth daily. , Disp: , Rfl:     cholecalciferol, vitamin D3, (Vitamin D3) 50 mcg (2,000 unit) tab, Take  by mouth., Disp: , Rfl:    Date Last Reviewed: 8/18/2021   Number of Personal Factors/Comorbidities that affect the Plan of Care: 0: LOW COMPLEXITY   EXAMINATION:   External Observations:   Voluntary contraction: [] absent     [x] present   Involuntary contraction: [x] absent     [] present   Involuntary relaxation: [] absent     [x] present   Perineal descent at rest: [] absent     [x] present   Perineal descent with bearing: [] absent     [x] present   Skin integrity: WNL    Pelvic Floor Muscle (PFM) Assessment:   Vaginal vault size: [] decreased     [] increased     [x] WNL   Muscle volume: [] decreased     [x] WNL     [] Defect   PFM tension: [] decreased     [x] increased     [] WNL    Contraction ability:  Voluntary contraction: [] absent     [] weak     [x] moderate      [] strong  Voluntary relaxation: [] absent     [x] partial     [] complete; delayed   MMT: 3/5   PFM endurance: <3 seconds   Repetitions of endurance contractions: DNT due to poor coordination  Number of quick contractions in 10 seconds: DNT due to poor coordination  Quality of contractions: Fair with significant breath holding and abdominal compensation; with activation of abdominal increased decent of bladder    Tissue laxity test:  Anterior wall: [] minimum     [x] moderate     [] severe      [] WNL  Posterior wall: [x] minimum     [] moderate     [] severe      [] WNL    Palpation: via vaginal canal   Superficial Pelvic Floor Musculature (PFM): Tender? Intermediate PFM Tender? Deep PFM Tender? Superficial Transverse Perineal [] Right  [] Left  []DNT Deep Transverse Perineal [] Right  [] Left  []DNT Puborectalis [] Right  [] Left  []DNT   Ischiocavernosus [] Right  [] Left  []DNT Compressor Urethra [] Right  [] Left  []DNT Pubococcygeus [] Right  [] Left  []DNT   Bulbocavernosus [] Right  [] Left  []DNT   Iliococcygeus [] Right  [] Left  []DNT       Obturator Internus [] Right  [] Left  []DNT       Coccygeus [] Right  [] Left  []DNT     Breath assessment:  Observation: A/P chest expansion and lateral rib expansion  Coordination of pelvic floor muscles with breath: minimal pelvic floor muscle excursion  Co-contraction of PFM with transversus abdominis: present and noted visible decent of bladder with abdominal engagement      Body Structures Involved:  1. Nerves  2. Muscles  3. Ligaments Body Functions Affected:  1. Sensory/Pain  2. Genitourinary  3. Neuromusculoskeletal  4. Movement Related Activities and Participation Affected:  1. Learning and Applying Knowledge  2. General Tasks and Demands  3. Mobility  4. Self Care  5.  Community, Social and [place]   Number of elements (examined above) that affect the Plan of Care: 3: MODERATE COMPLEXITY   CLINICAL PRESENTATION:   Presentation: Stable and uncomplicated: LOW COMPLEXITY   CLINICAL DECISION MAKING:   Use of outcome tool(s) and clinical judgement create a POC that gives a: Clear prediction of patient's progress: LOW COMPLEXITY

## 2021-08-25 ENCOUNTER — HOSPITAL ENCOUNTER (OUTPATIENT)
Dept: PHYSICAL THERAPY | Age: 76
Discharge: HOME | End: 2021-08-25
Payer: MEDICARE

## 2021-08-25 PROCEDURE — 97530 THERAPEUTIC ACTIVITIES: CPT

## 2021-08-25 PROCEDURE — 97110 THERAPEUTIC EXERCISES: CPT

## 2021-08-25 PROCEDURE — 97140 MANUAL THERAPY 1/> REGIONS: CPT

## 2021-08-25 NOTE — PROGRESS NOTES
Arianne Ma  : 1945  [place]  Secondary: [address] at [facility]  [address], [address], [place] [address]  [phone]   [phone]      OUTPATIENT PHYSICAL THERAPY: Daily Treatment Note 2021   Visit Count:  2  ICD-10: Treatment Diagnosis: Lack of coordination (muscle incoordination) (R27.8), Generalized weakness (M62.81), Mixed incontinence (Urge and stress incontinence) (N39.46) and Frequency of micturition (R35.0)  Precautions/Allergies:   Gluten and Milk containing products   TREATMENT PLAN:  Effective Dates: 2021 TO 2021 (90 days). Frequency/Duration: 1 time a week for 90 Day(s) MEDICAL/REFERRING DIAGNOSIS:  Stress incontinence [N39.3]   DATE OF ONSET: 3+ years  REFERRING PHYSICIAN: Keenan Morin DO  MD Orders: Evaluate and treat  Return MD Appointment: Not scheduled/after PT     Pre-treatment Symptoms/Complaints:  JENNIFER    Did well yesterday in regards to urgency/frequency, but worse today due to bowel frequency after eating a large salad last night. Feels like she is isolating her contraction better with practice. Pain: Initial: Pain Intensity 1: 0  Post Session:  0/10   Medications Last Reviewed:  2021  Updated Objective Findings:  None Today   TREATMENT:   THERAPEUTIC EXERCISE: (25 minutes):  Exercises per grid below to improve mobility, strength and coordination. Required maximal verbal and tactile cues to promote proper body breathing techniques and minimze accessory muscle use. Progressed range and repetitions as indicated.   TE= therapeutic exercise, TA= therapeutic activity, MT= manual therapy, NE= neuro re-education   Date:  21 Date:  21 Date:     Activity/Exercise Parameters Parameters Parameters   PFM coordination/strength Isolation of contractions with maintaining normal breathing Holds and quick contractions with focus on coordination w/ digital palpation HEP 2H/5R x10, 3x/day     Diaphragmatic breathing  5 minutes    Adductor stretch  3x30s    Single knee to chest  3x30s B    Happy baby  3x30s            THERAPEUTIC ACTIVITY: ( 15 minutes): Functional activity education on avoiding bladder irritants, substitutions for common irritants, recommended fluid intake (types and spacing), appropriate voiding frequency, weight management and overall contributing factors of bowel health on bladder health/function in order to improve independent self management. Patient was provided a list of common bladder irritants including caffeine, carbonation, alcohol, artificial sweeteners, chocolate, acidic drinks, and tomato based drinks. TA Educational Topic Notes Date Completed   Pathology/Anatomy/PFM Function  8/18/21   Bladder health education  8/25/21   Urinary urge suppression     The ani     Voiding strategies     Bowel/Bladder log     Bowel health education     Constipation care     Diarrhea/Fecal leakage     Colonic massage     Toilet positioning     Defecation dynamics     Sources of fiber     Return to intercourse/Dilator training     Sexual positioning     Lubricants/vaginal moisturizers     Vaginal irritants     Body mechanics     Posture/ergonomics     Diaphragmatic breathing     Resources and technology       MANUAL THERAPY: (15 minutes): Soft tissue mobilization was utilized and necessary because of the patient's restricted motion of soft tissue.     Date Type Location Comments   8/25/2021 Internal assessment/treatment Via vaginal canal Single digital MT to all PFM layer to improve tissue mobility    STM adductors Skin rolling                                 (Used abbreviations: MET - muscle energy technique; SCS- Strain counter strain; CTM-Connective tissue mobilizations; CR- Contract/Relax; SP- Sustained pressure; PIT- Positional inhibition techniques; STM Soft -tissue mobilization; MM- Myofascial mobilization; TrP-Trigger point release; IASTM- Instrument assisted soft tissue mobilizations, TDN-Trigger point dry needling)    Pt gives verbal consent to internal vaginal assessment/treatment without chaperon present. Access Code: [email or web address]  URL: [email or web address]  Date: 08/25/2021  Prepared by: Rashid Hairston    Exercises  Supine Hip Adductor Stretch - 2 x daily - 7 x weekly - 3 reps - 30 hold  Single Knee to Chest Stretch - 2 x daily - 7 x weekly - 3 reps - 30 hold  Supine Pelvic Floor Stretch - Hands on Knees - 2 x daily - 7 x weekly - 3 reps - 30 hold    Treatment/Session Summary:    · Response to Treatment:  Patient with difficulty of isolation of pelvic floor contraction today, however this did improve with repetitions. She has inconsistent stability of endurance holds with occasional breath holding noted. Patient was instructed today in coordination and use of quick contractions which improved with practice. Patient will practice as part of a HEP in preparation for implementation in urge suppression at next appointment. Patient demonstrates global muscle tension and guarding. She states that she is a very tight person overall. She was instructed in diaphragmatic breathing and basic flexibility program in order to improve muscle tension and mobility of tissue. She demonstrates good understanding of contributing factors of muscle tension to pelvic floor dysfunction. She was also educated on bladder health and bladder norms in order to improve understanding and awareness of contributing factors to urgency and frequency symptoms. · Communication/Consultation:  None today  · Equipment provided today:  None today  · Recommendations/Intent for next treatment session: Next visit will focus on coordination of PFM, urge suppression, 'the knack'.   · Variation from POC: pt OOT 9/16-9/24  Total Treatment Billable Duration: 25 minutes TE, 15 minutes TA, 15 minutes MT  PT Patient Time In/Time Out  Time In: 5540  Time Out: 7968  Connor Leary, PT, DPT     Future Appointments   Date Time Provider You Molina   9/1/2021  2:00 PM Judit Ball, AMIRAT [facility]   9/8/2021 11:00 AM Jovani Donahue PT, DPT [facility]   9/8/2021  1:30 PM Humera Mcneil MD END BS ENDO   9/15/2021 11:00 AM [facility]   9/29/2021 11:00 AM [facility]   10/6/2021 11:00 AM Pinky Kerr PT, DPT [facility]   12/3/2021  9:00 AM CAFM LAB [facility] CAF   12/10/2021  9:20 AM Valente Self MD [facility]

## 2021-09-01 ENCOUNTER — HOSPITAL ENCOUNTER (OUTPATIENT)
Dept: PHYSICAL THERAPY | Age: 76
Discharge: HOME | End: 2021-09-01
Payer: MEDICARE

## 2021-09-01 PROCEDURE — 97140 MANUAL THERAPY 1/> REGIONS: CPT

## 2021-09-01 PROCEDURE — 97110 THERAPEUTIC EXERCISES: CPT

## 2021-09-01 PROCEDURE — 97530 THERAPEUTIC ACTIVITIES: CPT

## 2021-09-01 NOTE — PROGRESS NOTES
Sussy Soni  : 1945  [place]  Secondary: [address] at [facility]  [address], [address], [place] [address]  [phone]   [phone]      OUTPATIENT PHYSICAL THERAPY: Daily Treatment Note 2021   Visit Count:  3  ICD-10: Treatment Diagnosis: Lack of coordination (muscle incoordination) (R27.8), Generalized weakness (M62.81), Mixed incontinence (Urge and stress incontinence) (N39.46) and Frequency of micturition (R35.0)  Precautions/Allergies:   Gluten and Milk containing products   TREATMENT PLAN:  Effective Dates: 2021 TO 2021 (90 days). Frequency/Duration: 1 time a week for 90 Day(s) MEDICAL/REFERRING DIAGNOSIS:  Stress incontinence [N39.3]   DATE OF ONSET: 3+ years  REFERRING PHYSICIAN: Sarahi Carr DO  MD Orders: Evaluate and treat  Return MD Appointment: Not scheduled/after PT     Pre-treatment Symptoms/Complaints:  JENNIFER    Doing well with exercises \"I've only missed one morning\". Some soreness with stretching, but feels that will improve as she practices more. Urinary leakage is getting better. \"I'm not wearing a pad and I'm getting to the point where when I cough or stand up I'm not leaking. \"    Pain: Initial: Pain Intensity 1: 0  Post Session:  0/10   Medications Last Reviewed:  2021  Updated Objective Findings:  moderate abdominal use with hold kegels   TREATMENT:   THERAPEUTIC EXERCISE: (15 minutes):  Exercises per grid below to improve mobility, strength and coordination. Required moderate verbal and tactile cues to promote proper body breathing techniques and minimze accessory muscle use. Progressed range and repetitions as indicated.   TE= therapeutic exercise, TA= therapeutic activity, MT= manual therapy, NE= neuro re-education   Date:  21 Date:  21 Date:  21   Activity/Exercise Parameters Parameters Parameters   PFM coordination/strength Isolation of contractions with maintaining normal breathing Holds and quick contractions with focus on coordination w/ digital palpation Holds and quick contractions with focus on coordination w/ digital palpation   HEP 2H/5R x10, 3x/day     Diaphragmatic breathing  5 minutes    Adductor stretch  3x30s    Single knee to chest  3x30s B    Happy baby  3x30s    Lumbar rotation   x10 w/ feet wide, add to HEP     THERAPEUTIC ACTIVITY: (25 minutes): Functional activity education on avoiding bladder irritants, substitutions for common irritants, recommended fluid intake (types and spacing), appropriate voiding frequency, weight management and overall contributing factors of bowel health on bladder health/function in order to improve independent self management. Patient was provided a list of common bladder irritants including caffeine, carbonation, alcohol, artificial sweeteners, chocolate, acidic drinks, and tomato based drinks., Functional activity training on role and use of urge suppression in order to delay voiding, minimize urge urinary incontinence and improve control in the presence of urge triggers (ie. running water, key in lock, cold, etc.) and Functional activity instruction on use 'the knack' and feedforward activation of pelvic floor muscles prior to activity that increases intra-abdominal pressure (IAP) such as cough, sneeze, laugh in order to minimize stress urinary incontinence.   TA Educational Topic Notes Date Completed   Pathology/Anatomy/PFM Function  8/18/21   Bladder health education   reviewed 8/25/21 9/1/21   Urinary urge suppression  9/1/21   The knack  9/1/21   Voiding strategies     Bowel/Bladder log     Bowel health education     Constipation care     Diarrhea/Fecal leakage     Colonic massage     Toilet positioning     Defecation dynamics     Sources of fiber     Return to intercourse/Dilator training     Sexual positioning     Lubricants/vaginal moisturizers     Vaginal irritants     Body mechanics Posture/ergonomics     Diaphragmatic breathing     Resources and technology       MANUAL THERAPY: (15 minutes): Soft tissue mobilization was utilized and necessary because of the patient's restricted motion of soft tissue. Date Type Location Comments   9/1/2021 Internal assessment/treatment Via vaginal canal Single digital MT to all PFM layer to improve tissue mobility    STM adductors Skin rolling                                 (Used abbreviations: MET - muscle energy technique; SCS- Strain counter strain; CTM-Connective tissue mobilizations; CR- Contract/Relax; SP- Sustained pressure; PIT- Positional inhibition techniques; STM Soft -tissue mobilization; MM- Myofascial mobilization; TrP-Trigger point release; IASTM- Instrument assisted soft tissue mobilizations, TDN-Trigger point dry needling)    Pt gives verbal consent to internal vaginal assessment/treatment without chaperon present. Access Code: [email or web address]  URL: [email or web address]  Date: 08/25/2021  Prepared by: Zachary Palms    Exercises  Supine Hip Adductor Stretch - 2 x daily - 7 x weekly - 3 reps - 30 hold  Single Knee to Chest Stretch - 2 x daily - 7 x weekly - 3 reps - 30 hold  Supine Pelvic Floor Stretch - Hands on Knees - 2 x daily - 7 x weekly - 3 reps - 30 hold  Lumbar rotation stretch, feet wide 3x30s    Treatment/Session Summary:    · Response to Treatment: Continued accessory mm use of abdominals with endurance holds and quick contraction. Improved isolation of quick contractions with verbal cuing. Pt educated on use of urge suppression in order to improve urge control and timing between voids in order to normalize bladder frequency/urgency. She was also instructed in use of 'the knack' in order to minimize JENNIFER with cough/sneeze and sit to stand. Patient demonstrates and verbalizes good understanding for I over the next week.   · Communication/Consultation:  None today  · Equipment provided today:  None today  · Recommendations/Intent for next treatment session: Next visit will focus on coordination of PFM, check in urge suppression, 'the knack'.   · Variation from POC: pt OOT 9/16-9/24  Total Treatment Billable Duration: 15 minutes TE, 25 minutes TA, 15 minutes MT  PT Patient Time In/Time Out  Time In: 1400  Time Out: [address], PT, DPT     Future Appointments   Date Time Provider You Gillisi   9/8/2021 11:00 AM Carol Arriaga PT, DPT [facility]   9/8/2021  1:30 PM Jermaine Mcneil MD END [facility]   9/15/2021 11:00 AM Carol Arriaga PT, DPT [facility]   9/29/2021 11:00 AM Carol Arriaga PT, DPT [facility]   10/6/2021 11:00 AM Carol Arriaga PT, DPT [facility]   12/3/2021  9:00 AM [facility]   12/10/2021  9:20 AM Stacy Castañeda MD [facility]

## 2021-09-08 ENCOUNTER — HOSPITAL ENCOUNTER (OUTPATIENT)
Dept: PHYSICAL THERAPY | Age: 76
Discharge: HOME | End: 2021-09-08
Payer: MEDICARE

## 2021-09-08 PROCEDURE — 97140 MANUAL THERAPY 1/> REGIONS: CPT

## 2021-09-08 PROCEDURE — 97110 THERAPEUTIC EXERCISES: CPT

## 2021-09-08 NOTE — PROGRESS NOTES
Ray Aiken  : 1945  [place]  Secondary: [address] [facility]  [address], [address], [place] [address]  [phone]   [phone]      OUTPATIENT PHYSICAL THERAPY: Daily Treatment Note 2021   Visit Count:  4  ICD-10: Treatment Diagnosis: Lack of coordination (muscle incoordination) (R27.8), Generalized weakness (M62.81), Mixed incontinence (Urge and stress incontinence) (N39.46) and Frequency of micturition (R35.0)  Precautions/Allergies:   Gluten and Milk containing products   TREATMENT PLAN:  Effective Dates: 2021 TO 2021 (90 days). Frequency/Duration: 1 time a week for 90 Day(s) MEDICAL/REFERRING DIAGNOSIS:  Stress incontinence [N39.3]   DATE OF ONSET: 3+ years  REFERRING PHYSICIAN: Laith Jj DO  MD Orders: Evaluate and treat  Return MD Appointment: Not scheduled/after PT     Pre-treatment Symptoms/Complaints:  JENNIFER    Things are going well. Continues to have some soreness with stretching but having some soreness with this. So has decreased intensity of th pulling. She is using urge suppression with some success. Harder to use in AM with drinking coffee and diuretics. Reports feeling less anxiety and stress around using the bathroom. Decreased daytime frequency to <8x/day and 1-2x/night. Pain: Initial: Pain Intensity 1: 0  Post Session:  0/10   Medications Last Reviewed:  2021  Updated Objective Findings:  PERF: 2/<4/8; moderate verbal cuing to minimize abdominal use   TREATMENT:   THERAPEUTIC EXERCISE: (40 minutes):  Exercises per grid below to improve mobility, strength and coordination. Required moderate verbal and tactile cues to promote proper body breathing techniques and minimze accessory muscle use. Progressed resistance, range, repetitions and complexity of movement as indicated.  All exercises performed with emphasis on kegel and breathing in order improve coordination, awareness and to minimize symptoms. TE= therapeutic exercise, TA= therapeutic activity, MT= manual therapy, NE= neuro re-education   Date:  8/18/21 Date:  8/25/21 Date:  9/1/21 Date:  9/8/21   Activity/Exercise Parameters Parameters Parameters    PFM coordination/strength Isolation of contractions with maintaining normal breathing Holds and quick contractions with focus on coordination w/ digital palpation Holds and quick contractions with focus on coordination w/ digital palpation Holds and quick contractions w/ digital palpation; focusing on isolation and endurance 4H/5R   HEP 2H/5R x10, 3x/day   Holds 4H/5R and QF x10, 3x/day;   Diaphragmatic breathing  5 minutes     Adductor stretch  3x30s     Single knee to chest  3x30s B     Happy baby  3x30s     Lumbar rotation   x10 w/ feet wide, add to HEP    Stepper    7 minutes L1.5   Hip abduction    3x10   Bridge     x15   Sit to stand    x10      THERAPEUTIC ACTIVITY: (0 minutes): Functional activity education on avoiding bladder irritants, substitutions for common irritants, recommended fluid intake (types and spacing), appropriate voiding frequency, weight management and overall contributing factors of bowel health on bladder health/function in order to improve independent self management. Patient was provided a list of common bladder irritants including caffeine, carbonation, alcohol, artificial sweeteners, chocolate, acidic drinks, and tomato based drinks., Functional activity training on role and use of urge suppression in order to delay voiding, minimize urge urinary incontinence and improve control in the presence of urge triggers (ie. running water, key in lock, cold, etc.) and Functional activity instruction on use 'the knack' and feedforward activation of pelvic floor muscles prior to activity that increases intra-abdominal pressure (IAP) such as cough, sneeze, laugh in order to minimize stress urinary incontinence.   TA Educational Topic Notes Date Completed   Pathology/Anatomy/PFM Function  8/18/21   Bladder health education   reviewed 8/25/21 9/1/21   Urinary urge suppression  9/1/21   The knnegar  9/1/21   Voiding strategies     Bowel/Bladder log     Bowel health education     Constipation care     Diarrhea/Fecal leakage     Colonic massage     Toilet positioning     Defecation dynamics     Sources of fiber     Return to intercourse/Dilator training     Sexual positioning     Lubricants/vaginal moisturizers     Vaginal irritants     Body mechanics     Posture/ergonomics     Diaphragmatic breathing     Resources and technology       MANUAL THERAPY: (15 minutes): Soft tissue mobilization was utilized and necessary because of the patient's restricted motion of soft tissue. Date Type Location Comments   9/8/2021 Internal assessment/treatment Via vaginal canal Single digital MT to all PFM layer to improve tissue mobility    STM adductors Skin rolling                                 (Used abbreviations: MET - muscle energy technique; SCS- Strain counter strain; CTM-Connective tissue mobilizations; CR- Contract/Relax; SP- Sustained pressure; PIT- Positional inhibition techniques; STM Soft -tissue mobilization; MM- Myofascial mobilization; TrP-Trigger point release; IASTM- Instrument assisted soft tissue mobilizations, TDN-Trigger point dry needling)    Pt gives verbal consent to internal vaginal assessment/treatment without chaperon present. Access Code: [email or web address]  URL: [email or web address]  Date: 08/25/2021  Prepared by: Lesly Keys    Exercises  Supine Hip Adductor Stretch - 2 x daily - 7 x weekly - 3 reps - 30 hold  Single Knee to Chest Stretch - 2 x daily - 7 x weekly - 3 reps - 30 hold  Supine Pelvic Floor Stretch - Hands on Knees - 2 x daily - 7 x weekly - 3 reps - 30 hold  Lumbar rotation stretch, feet wide 3x30s    Treatment/Session Summary:    · Response to Treatment: Continues to have difficulty isolating PFM contraction from TA, mild improvements with repetition and verbal cuing. She demonstrates poor endurance with drop off after ~2 seconds, however able to recover contraction with verbal and tactile cuing. We discussed that it is important that she times holds/relaxations in order to better monitor changes and challenge endurance of PFM. Initiation of hip strengthening today with focus on PFM activation and breathing with movement in order to improve IAP management in order to minimize JENNIFER. Progressing well toard goals as indicated and reduced symptoms. · Communication/Consultation:  None today  · Equipment provided today:  None today  · Recommendations/Intent for next treatment session: Next visit will focus on coordination of PFM w/ movement, strength/endurance of PFM w/ kegels.   · Variation from POC: pt OOT 9/16-9/24  Total Treatment Billable Duration: 40 minutes TE, 15 minutes MT  PT Patient Time In/Time Out  Time In: 1100  Time Out: [address] [facility], PT, DPT     Future Appointments   Date Time Provider [facility]   9/8/2021  1:30 PM Starla Mcneil MD [facility] END[facility]   9/15/2021 11:00 AM [facility]ahue PT[facility]   9/29/2021 11:00 AM Loan Cameron PT, DPT [facility]   10/6/2021 11:00 AM Loan Cameron PT, DPT [facility]   12/3/2021  9:00 AM [facility]   12/10/2021  9:20 AM April Baltazar MD [facility] CA[facility]

## 2021-09-15 ENCOUNTER — HOSPITAL ENCOUNTER (OUTPATIENT)
Dept: PHYSICAL THERAPY | Age: 76
Discharge: HOME | End: 2021-09-15
Payer: MEDICARE

## 2021-09-15 NOTE — PROGRESS NOTES
Pietro Luu  : 1945  [place]  [facility] [address] [facility] [address], [place] [address]  [phone]   [phone]      OUTPATIENT DAILY NOTE    NAME/AGE/GENDER: Pietro Luu is a 70 y.o. female. DATE: 9/15/2021    Ms. Beau Solano for today's appointment due to illness. Pt was rescheduled for next week.     Marylene Bandy, PT, DPT

## 2021-09-23 ENCOUNTER — HOSPITAL ENCOUNTER (OUTPATIENT)
Dept: PHYSICAL THERAPY | Age: 76
Discharge: HOME | End: 2021-09-23
Payer: MEDICARE

## 2021-09-23 PROCEDURE — 97110 THERAPEUTIC EXERCISES: CPT

## 2021-09-23 PROCEDURE — 97140 MANUAL THERAPY 1/> REGIONS: CPT

## 2021-09-23 NOTE — PROGRESS NOTES
Jesus Castillo  : 1945  [place]  Secondary: [address] at [address]  [address], [place] [address]  [phone]   [phone]      OUTPATIENT PHYSICAL THERAPY: Daily Treatment Note 2021   Visit Count:  5  ICD-10: Treatment Diagnosis: Lack of coordination (muscle incoordination) (R27.8), Generalized weakness (M62.81), Mixed incontinence (Urge and stress incontinence) (N39.46) and Frequency of micturition (R35.0)  Precautions/Allergies:   Gluten and Milk containing products   TREATMENT PLAN:  Effective Dates: 2021 TO 2021 (90 days). Frequency/Duration: 1 time a week for 90 Day(s) MEDICAL/REFERRING DIAGNOSIS:  Stress incontinence [N39.3]   DATE OF ONSET: 3+ years  REFERRING PHYSICIAN: Mik Levi DO  MD Orders: Evaluate and treat  Return MD Appointment: Not scheduled/after PT     Pre-treatment Symptoms/Complaints:  JENNIFER    Has been trying to do exercises 2-3x/day. She feels like leakage is improving. She has not had any accidents. Pain: Initial: Pain Intensity 1: 0  Post Session:  0/10   Medications Last Reviewed:  2021  Updated Objective Findings:  holds to 6 seconds with mild abdominal use   TREATMENT:   THERAPEUTIC EXERCISE: (45 minutes):  Exercises per grid below to improve mobility, strength and coordination. Required minimal verbal and tactile cues to promote proper body breathing techniques and minimze accessory muscle use. Progressed resistance, range, repetitions and complexity of movement as indicated. All exercises performed with emphasis on kegel and breathing in order improve coordination, awareness and to minimize symptoms.     TE= therapeutic exercise, TA= therapeutic activity, MT= manual therapy, NE= neuro re-education   Date:  21 Date:  21 Date:  21 Date:  21 Date:  21   Activity/Exercise Parameters Parameters Parameters Parameters Parameters PFM coordination/strength Isolation of contractions with maintaining normal breathing Holds and quick contractions with focus on coordination w/ digital palpation Holds and quick contractions with focus on coordination w/ digital palpation Holds and quick contractions w/ digital palpation; focusing on isolation and endurance 4H/5R Holds and quick contractions w/ digital palpation; focusing on isolation and endurance 6H/6R   HEP 2H/5R x10, 3x/day   Holds 4H/5R and QF x10, 3x/day; See below; holds 6H/6R and quick contractions 3x/day   Diaphragmatic breathing  5 minutes      Adductor stretch  3x30s   3x30s B   Single knee to chest  3x30s B   2x30s B   Happy baby  3x30s   2x30s   Lumbar rotation   x10 w/ feet wide, add to HEP  2x20   Piriformis stretch     2x30s B   Stepper    7 minutes L1.5 7 minutes   Hip abduction    3x10 X20, lime   Bridge     x15 2x15, lime   Sit to stand    x10     Clamshell     X15, lime B   Rows     x15 on cables 14#   Monster walking          THERAPEUTIC ACTIVITY: (0 minutes): Functional activity education on avoiding bladder irritants, substitutions for common irritants, recommended fluid intake (types and spacing), appropriate voiding frequency, weight management and overall contributing factors of bowel health on bladder health/function in order to improve independent self management.  Patient was provided a list of common bladder irritants including caffeine, carbonation, alcohol, artificial sweeteners, chocolate, acidic drinks, and tomato based drinks., Functional activity training on role and use of urge suppression in order to delay voiding, minimize urge urinary incontinence and improve control in the presence of urge triggers (ie. running water, key in lock, cold, etc.) and Functional activity instruction on use 'the knack' and feedforward activation of pelvic floor muscles prior to activity that increases intra-abdominal pressure (IAP) such as cough, sneeze, laugh in order to minimize stress urinary incontinence. TA Educational Topic Notes Date Completed   Pathology/Anatomy/PFM Function  8/18/21   Bladder health education   reviewed 8/25/21 9/1/21   Urinary urge suppression  9/1/21   The knack  9/1/21   Voiding strategies     Bowel/Bladder log     Bowel health education     Constipation care     Diarrhea/Fecal leakage     Colonic massage     Toilet positioning     Defecation dynamics     Sources of fiber     Return to intercourse/Dilator training     Sexual positioning     Lubricants/vaginal moisturizers     Vaginal irritants     Body mechanics     Posture/ergonomics     Diaphragmatic breathing     Resources and technology       MANUAL THERAPY: (10 minutes): Soft tissue mobilization was utilized and necessary because of the patient's restricted motion of soft tissue. Date Type Location Comments   9/23/2021 Internal assessment/treatment Via vaginal canal Single digital MT to all PFM layer to improve tissue mobility    STM adductors Skin rolling                                 (Used abbreviations: MET - muscle energy technique; SCS- Strain counter strain; CTM-Connective tissue mobilizations; CR- Contract/Relax; SP- Sustained pressure; PIT- Positional inhibition techniques; STM Soft -tissue mobilization; MM- Myofascial mobilization; TrP-Trigger point release; IASTM- Instrument assisted soft tissue mobilizations, TDN-Trigger point dry needling)    Pt gives verbal consent to internal vaginal assessment/treatment without chaperon present. Access Code: [email or web address]  URL: [email or web address]  Date: 08/25/2021  Prepared by: Ronna Coats    Exercises  Supine Hip Adductor Stretch - 2 x daily - 7 x weekly - 3 reps - 30 hold  Single Knee to Chest Stretch - 2 x daily - 7 x weekly - 3 reps - 30 hold  Supine Pelvic Floor Stretch - Hands on Knees - 2 x daily - 7 x weekly - 3 reps - 30 hold  Lumbar rotation stretch, feet wide 3x30s  Supine Figure 4 Piriformis Stretch - 2 x daily - 7 x weekly - 3 reps - 30 hold  Bridge with Hip Abduction and Resistance - 1 x daily - 7 x weekly - 3 sets - 10 reps  Sidelying Clamshell with Pelvic Floor Contraction - 1 x daily - 7 x weekly - 3 sets - 10 reps    Treatment/Session Summary:    · Response to Treatment: Improving coordination of kegels with improving stability of hold and reduce accessory muscle use. She is progressing well with improved bladder control. Continues to require moderate verbal cuing for proper coordination of exhale with movement in order to minimize IAP. · Communication/Consultation:  None today  · Equipment provided today:  None today  · Recommendations/Intent for next treatment session: Next visit will focus on coordination of PFM w/ movement, strength/endurance of PFM w/ kegels.   · Variation from POC: N/A  Total Treatment Billable Duration: 45 minutes TE, 10 minutes MT  PT Patient Time In/Time Out  Time In: 1500  Time Out: [address], PT, DPT     Future Appointments   Date Time Provider You Tracy   9/29/2021 11:00 AM Raphael Houser PT, DPT [facility]   10/6/2021 11:00 AM Raphael Houser PT, DPT [facility]   12/3/2021  9:00 AM [facility]   12/10/2021  9:20 AM Karen Saleem MD [facility]   9/7/2022  1:30 PM Roger Mcneil MD END [facility] ENDO

## 2021-09-29 ENCOUNTER — HOSPITAL ENCOUNTER (OUTPATIENT)
Dept: PHYSICAL THERAPY | Age: 76
Discharge: HOME | End: 2021-09-29
Payer: MEDICARE

## 2021-09-29 PROCEDURE — 97530 THERAPEUTIC ACTIVITIES: CPT

## 2021-09-29 PROCEDURE — 97110 THERAPEUTIC EXERCISES: CPT

## 2021-09-29 NOTE — PROGRESS NOTES
Cristi Sanders  : 1945  [place]  Secondary: [address] at [facility]  [address], [address], [place]  [phone]   [phone]      OUTPATIENT PHYSICAL THERAPY: Daily Treatment Note 2021   Visit Count:  6  ICD-10: Treatment Diagnosis: Lack of coordination (muscle incoordination) (R27.8), Generalized weakness (M62.81), Mixed incontinence (Urge and stress incontinence) (N39.46) and Frequency of micturition (R35.0)  Precautions/Allergies:   Gluten and Milk containing products   TREATMENT PLAN:  Effective Dates: 2021 TO 2021 (90 days). Frequency/Duration: 1 time a week for 90 Day(s) MEDICAL/REFERRING DIAGNOSIS:  Stress incontinence [N39.3]   DATE OF ONSET: 3+ years  REFERRING PHYSICIAN: Arjun Bah DO  MD Orders: Evaluate and treat  Return MD Appointment: Not scheduled/after PT     Pre-treatment Symptoms/Complaints:  JENNIFER    She has been doing exercises about 2x/day this week due to being at a women's conference. She notes one small episode of leakage, getting up at night. Leakage was just a drop. Pain: Initial: Pain Intensity 1: 0  Post Session:  0/10   Medications Last Reviewed:  2021  Updated Objective Findings:  None Today   TREATMENT:   THERAPEUTIC EXERCISE: (45 minutes):  Exercises per grid below to improve mobility, strength and coordination. Required minimal verbal and tactile cues to promote proper body breathing techniques and minimze accessory muscle use. Progressed resistance, range, repetitions and complexity of movement as indicated. All exercises performed with emphasis on kegel and breathing in order improve coordination, awareness and to minimize symptoms.     TE= therapeutic exercise, TA= therapeutic activity, MT= manual therapy, NE= neuro re-education   Date:  21 Date:  21 Date:  21 Date:  21 Date:  21 Date:  21   Activity/Exercise Parameters Parameters Parameters Parameters Parameters    PFM coordination/strength Isolation of contractions with maintaining normal breathing Holds and quick contractions with focus on coordination w/ digital palpation Holds and quick contractions with focus on coordination w/ digital palpation Holds and quick contractions w/ digital palpation; focusing on isolation and endurance 4H/5R Holds and quick contractions w/ digital palpation; focusing on isolation and endurance 6H/6R    HEP 2H/5R x10, 3x/day   Holds 4H/5R and QF x10, 3x/day; See below; holds 6H/6R and quick contractions 3x/day    Diaphragmatic breathing  5 minutes       Adductor stretch  3x30s   3x30s B    Single knee to chest  3x30s B   2x30s B    Happy baby  3x30s   2x30s    Lumbar rotation   x10 w/ feet wide, add to HEP  2x20    Piriformis stretch     2x30s B    Stepper    7 minutes L1.5 7 minutes 8 minutes L1.5   Hip abduction    3x10 X20, lime x20, blue    Hip adduction      x20   Bridge     x15 2x15, lime x20 w/ ball hold (not squeezing)   Sit to stand    x10   2x10   Clamshell     X15, lime B    Rows     x15 on cables 14#    Sidestepping      2x40ft   Monster walking           THERAPEUTIC ACTIVITY: (10 minutes): Functional activity education on avoiding bladder irritants, substitutions for common irritants, recommended fluid intake (types and spacing), appropriate voiding frequency, weight management and overall contributing factors of bowel health on bladder health/function in order to improve independent self management.  Patient was provided a list of common bladder irritants including caffeine, carbonation, alcohol, artificial sweeteners, chocolate, acidic drinks, and tomato based drinks., Functional activity training on role and use of urge suppression in order to delay voiding, minimize urge urinary incontinence and improve control in the presence of urge triggers (ie. running water, key in lock, cold, etc.) and Functional activity instruction on use 'the knack' and feedforward activation of pelvic floor muscles prior to activity that increases intra-abdominal pressure (IAP) such as cough, sneeze, laugh in order to minimize stress urinary incontinence. TA Educational Topic Notes Date Completed   Pathology/Anatomy/PFM Function  8/18/21   Bladder health education   reviewed 8/25/21 9/1/21   Urinary urge suppression  9/1/21   The knack  9/1/21   Voiding strategies     Bowel/Bladder log     Bowel health education     Constipation care     Diarrhea/Fecal leakage     Colonic massage     Toilet positioning     Defecation dynamics     Sources of fiber     Return to intercourse/Dilator training     Sexual positioning     Lubricants/vaginal moisturizers     Vaginal irritants     Body mechanics Lifting mechanics  9/29/21   Posture/ergonomics     Diaphragmatic breathing     Resources and technology       MANUAL THERAPY: (0 minutes): Soft tissue mobilization was utilized and necessary because of the patient's restricted motion of soft tissue. Date Type Location Comments   9/29/2021 Internal assessment/treatment Via vaginal canal Single digital MT to all PFM layer to improve tissue mobility    STM adductors Skin rolling                                 (Used abbreviations: MET - muscle energy technique; SCS- Strain counter strain; CTM-Connective tissue mobilizations; CR- Contract/Relax; SP- Sustained pressure; PIT- Positional inhibition techniques; STM Soft -tissue mobilization; MM- Myofascial mobilization; TrP-Trigger point release; IASTM- Instrument assisted soft tissue mobilizations, TDN-Trigger point dry needling)    Pt gives verbal consent to internal vaginal assessment/treatment without chaperon present. Access Code: [email or web address]  URL: [email or web address]  Date: 08/25/2021  Prepared by: Barbara Andres    Exercises  Supine Hip Adductor Stretch - 2 x daily - 7 x weekly - 3 reps - 30 hold  Single Knee to Chest Stretch - 2 x daily - 7 x weekly - 3 reps - 30 hold  Supine Pelvic Floor Stretch - Hands on Knees - 2 x daily - 7 x weekly - 3 reps - 30 hold  Lumbar rotation stretch, feet wide 3x30s  Supine Figure 4 Piriformis Stretch - 2 x daily - 7 x weekly - 3 reps - 30 hold  Bridge with Hip Abduction and Resistance - 1 x daily - 7 x weekly - 3 sets - 10 reps  Sidelying Clamshell with Pelvic Floor Contraction - 1 x daily - 7 x weekly - 3 sets - 10 reps    Treatment/Session Summary:    · Response to Treatment: Pt with much improve coordination of breathing with movement and PFM engagement today. Pt was educated on functional integration of kegel with lifting mechanics in order to decrease IAP and improve core stability with lifting.   · Communication/Consultation:  None today  · Equipment provided today:  None today  · Recommendations/Intent for next treatment session: Next visit will focus on coordination of PFM w/ movement, strength/endurance of PFM w/ kegels (check strength)  · Variation from POC: N/A  Total Treatment Billable Duration: 45 minutes TE, 10 minutes TA  PT Patient Time In/Time Out  Time In: 1100  Time Out: 600 [facility], PT, DPT     Future Appointments   Date Time Provider You Tracy   10/6/2021 11:00 AM Beck Mc PT, DPT [facility]   12/3/2021  9:00 AM [facility]   12/10/2021  9:20 AM Christiana Hawkins MD [facility]   9/7/2022  1:30 PM Cornelio Mcneil MD END BS ENDO

## 2021-10-06 ENCOUNTER — HOSPITAL ENCOUNTER (OUTPATIENT)
Dept: PHYSICAL THERAPY | Age: 76
Discharge: HOME | End: 2021-10-06
Payer: MEDICARE

## 2021-10-06 PROCEDURE — 97530 THERAPEUTIC ACTIVITIES: CPT

## 2021-10-06 PROCEDURE — 97110 THERAPEUTIC EXERCISES: CPT

## 2021-10-06 PROCEDURE — 97140 MANUAL THERAPY 1/> REGIONS: CPT

## 2021-10-06 NOTE — THERAPY DISCHARGE
Jaja Coates  : 1945  [place]  Secondary: [address] at [facility]  [facility], [address], [place] [address]  [phone]   [phone]        OUTPATIENT PHYSICAL THERAPY:Discharge Summary 10/6/2021   ICD-10: Treatment Diagnosis: Lack of coordination (muscle incoordination) (R27.8), Generalized weakness (M62.81), Mixed incontinence (Urge and stress incontinence) (N39.46) and Frequency of micturition (R35.0)  Precautions/Allergies:   Gluten and Milk containing products   TREATMENT PLAN:  Effective Dates: 2021 TO 2021 (90 days). Frequency/Duration: 1 time a week for 90 Day(s) MEDICAL/REFERRING DIAGNOSIS:  Stress incontinence (female) (male) [N39.3]   DATE OF ONSET: 3+ years  REFERRING PHYSICIAN: Adonis Riedel, DO  MD Orders: Evaluate and treat  Return MD Appointment: Not scheduled/after PT   DISCHARGE SUMMARY: Ms. Carol Emmanuel has been seen in skilled PT from 21 to 10/6/21. Patient has attended 7 out of 7 scheduled visits, with 0 cancellation(s) and 0 no shows. Treatment has emphasized PFM retraining/coordination, relaxation and awareness of PFM, core/PFM engagement during movement, urge suppression for improve urge control and decreased frequency and bowel/bladder health to improve understanding and awareness of effect on symptoms. Patient responded well to therapy, with improvements in urinary incontinence, urinary urge control, reduced urinary frequency and improve freedom and quality of life. Pt has achieved goals as indicated below and all questions have been answered to their satisfaction. Pt was invited to call with any further questions or concerns as needed.     INITIAL ASSESSMENT: Jaja Coates presents with musculoskeletal limitations including pelvic floor muscle (PFM) tension, reduced coordination and awareness of PFM, reduced hip strength, poor diaphragm excursion and decreased pelvic floor muscle (PFM) strength. These limitations are impairing the patient's ability to properly coordinate perineal elevation and descent for normalized PFM function, contributing to urinary dysfunction, including mixed urinary dysfunction and frequency of urination. As a result, she is limited in her/his ability to participate in maintaining normal bladder control throughout the day with urgency and activities with coughing, laughing sneezing and sit to stand transitions. GOALS: (Goals have been discussed and agreed upon with patient.)  Short-Term Functional Goals: Time Frame: 6 weeks  1. Pt will demonstrate I with basic PFM HEP to improve awareness, coordination, and timing of PFM. (MET 10/6/21)  2. Pt will demonstrate understanding of and ability to teach back two strategies to reduce constipation and improve toileting to minimize strain on the pelvic floor muscles. (MET 10/6/21)  3. Pt will demonstrate understanding of and ability to teach back appropriate water intake, bladder irritants, toileting frequency, and positioning for improved self-management of symptoms. (MET 10/6/21)  4. Pt will demonstrate ability to isolate a pelvic floor contraction without breath holding and minimal to no accessory muscle use in order to implement with the knack and/or urge suppression in order to reduce UI episodes by 50%. (MET 10/6/21)  5. Pt will demonstrate ability to perform diaphragmatic breathing in multiple positions to assist in pelvic floor tension reduction. (MET 10/6/21)  Discharge Goals: Time Frame: 12 weeks  1. Pt will improve outcome score by 12%. (MET 10/6/21)  2. Pt will demonstrate ability to voluntarily contract the pelvic floor muscles prior to a cough or sneeze for decreased leakage during increases in intra-abdominal pressure. (MET 10/6/21)  3. Pt will be able to perform sit to stand without urinary leakage for improved participation in recreational activities and ADL's. (MET 10/6/21)  4.  Pt will demonstrate independence with an advanced HEP for general conditioning, core stabilization, and mobility to facilitate carry over and independent management of symptoms. (MET 10/6/21)  5. Pt will be independent in implementation of a timed voiding schedule and use of urge suppression to reduce urinary frequency to 8/day and <2/night. (MET 10/6/21)    OUTCOME MEASURE:   Tool Used: Pelvic Floor Impact Questionnaireshort form 7 (PFIQ-7)   Score:  Initial: 8/18/21  · Bladder or Urine: 28.57/100  · Bowel or Rectum: 0/100  · Vagina or Pelvis: 0/100 Most Recent: 10/6/21  · Bladder or Urine: 0/100  · Bowel or Rectum: 0/100  · Vagina or Pelvis: 0/100   Interpretation of Score: Each of the 7 sections is scored on a scale from 0-3; 0 representing \"Not at all\", 3 representing \"Quite a bit\". The mean value is taken from all the answered items, then multiplied by 100 to obtain the scale score, ranging from 0-100. This process is repeated for each column representing bowel, bladder, and pelvic pain. Total Duration:  PT Patient Time In/Time Out  Time In: 1100  Time Out: 1200    Rehabilitation Potential For Stated Goals: Good  Regarding José Miguelbecka Koehler's therapy, I certify that the treatment plan above will be carried out by a therapist or under their direction. Thank you for this referral,  Anita Reza, PT, DPT        PAIN/SUBJECTIVE:   Initial:    Post Session:  0/10   HISTORY:   History of Injury/Illness (Reason for Referral):  Ms. Reynaldo Sosa is a 71 yo female referred to pelvic floor physical therapy (PFPT) by Jean-Paul Núñez DO 2/2 stress urinary incontinence (JENNIFER). Pt reports that symptoms began 3+ years ago. Previous treatment includes nothing. Started topical estrogen about 3 weeks ago. And this has helped a lot. No longer having burning with she urinates, \"blisters\" are gone now. Pt with urinary leakage with coughing, laughing, sneezing. She does leakage with urgency.  Does have more control since starting topicals. Has also tried to drink mostly water. Urges are less intense. She is not wearing pads any longer. 10/6/21 update: pt is doing well and is very happy with the improvements that she has seen since starting PT. She is better able to control urgency in order to reduce urinary frequency. She feels that she is better able to go out of the house without having to bathroom plot. Reports that she is no longer experiencing UI, notes a few drops in panties upon waking if she drinks later in the evening or out to dinner later. Urinary: Frequency 6 x/day, 0-1 x/night. Negative for stress urinary incontinence, urge urinary incontinence, urinary urgency, urinary frequency, incomplete emptying, urinary hesitancy/intermittency, dysuria, hematuria, nocturia and nocturnal enuresis. Pt does not use pads for urinary protection; 0 pads per day (PPD). Might change 1 time per week \"if she drinks too much\" and wakes up at night. Fluid intake: 48 oz water/day; bladder irritants include: 1c coffee, ginger ale. Pt does not limit fluid intake due to bladder control. Bowel: Frequency daily. She does splint to evacuate bowels. Negative for pain with bowel movement, pushing/straining with bowel movement, fecal incontinence, constipation and incomplete emptying. Pt does not use pads for bowel protection; 0 pads per day (PPD). Use of stool softeners or laxatives? NO; takes Nury probiotic. She does have a history of diverticulitis. Sexual: Pt is not sexually active. Pelvic Organ Prolapse/Pelvic Pain: Denies pelvic pain, pressure, vaginal pain, bladder pain or rectal pain.     Past Medical History/Comorbidities:   Ms. Trice Thomason  has a past medical history of Atrial fibrillation ([facility] [address] Depression with anxiety, Diverticulosis of colon, Essential hypertension, GERD (gastroesophageal reflux disease), Hashimoto's thyroiditis, Hypercholesterolemia, Migraine, Mitral valve prolapse, Multiple thyroid nodules, Peptic ulcer of stomach, Postmenopausal, Primary hypothyroidism, and Spider telangiectasis. Ms. Rehan Olmedo  has a past surgical history that includes hx breast reconstruction (Bilateral, 1990); hx hysterectomy (age 30); hx appendectomy (age 27); hx cholecystectomy (2008); hx hernia repair (age 41); hx endoscopy (3/2016); hx heent (06/2014); hx breast biopsy; hx colonoscopy; and colonoscopy (N/A, 2/15/2021). Current Medications:       Current Outpatient Medications:     citalopram (CELEXA) 20 mg tablet, Take 1 Tablet by mouth daily. , Disp: 90 Tablet, Rfl: 3    ALPRAZolam (XANAX) 1 mg tablet, Take 1 Tablet by mouth three (3) times daily as needed for Anxiety. Max Daily Amount: 3 mg., Disp: 90 Tablet, Rfl: 0    levothyroxine (SYNTHROID) 50 mcg tablet, 1 tablet daily 6 days/week, Disp: 26 Tablet, Rfl: 11    OTHER, Estradiol 0.1mg + vitamin E 100IU, 30 grams Insert 0.5g into the vagina 2-3 times weekly  Compounding solutions, Disp: 30 g, Rfl: 5    pantoprazole (PROTONIX) 40 mg tablet, Take 1 Tablet by mouth daily. , Disp: 90 Tablet, Rfl: 3    metoprolol succinate (TOPROL-XL) 50 mg XL tablet, Take 1 Tablet by mouth daily. , Disp: 1 Tablet, Rfl: 0    hydroCHLOROthiazide (HYDRODIURIL) 25 mg tablet, Take 1 Tablet by mouth daily. , Disp: 90 Tablet, Rfl: 1    SUMAtriptan (Imitrex) 100 mg tablet, Take 1 Tablet by mouth as needed for Migraine. , Disp: 10 Tablet, Rfl: 6    folic acid/multivit-min/lutein (CENTRUM SILVER PO), Take  by mouth daily. , Disp: , Rfl:     mupirocin (BACTROBAN) 2 % ointment, Apply  to affected area daily. , Disp: 22 g, Rfl: 0    meloxicam (MOBIC) 15 mg tablet, 1 tab daily, Disp: 90 Tab, Rfl: 3    zonisamide (ZONEGRAN) 100 mg capsule, Take 1 Cap by mouth nightly., Disp: 90 Cap, Rfl: 3    dicyclomine (BENTYL) 20 mg tablet, Take 20 mg by mouth every six (6) hours as needed for Abdominal Cramps., Disp: , Rfl:     aspirin delayed-release 81 mg tablet, Take 81 mg by mouth daily. , Disp: , Rfl:    cholecalciferol, vitamin D3, (Vitamin D3) 50 mcg (2,000 unit) tab, Take  by mouth., Disp: , Rfl:    Date Last Reviewed: 10/6/2021   EXAMINATION:   External Observations:   Voluntary contraction: [] absent     [x] present   Voluntary relaxation: [] absent     [x] present   Involuntary contraction: [] absent     [x] present   Involuntary relaxation: [] absent     [x] present   Perineal descent at rest: [x] absent     [] present   Perineal descent with bearing: [] absent     [x] present   Skin integrity: WNL    Pelvic Floor Muscle (PFM) Assessment:   Vaginal vault size: [] decreased     [] increased     [x] WNL   Muscle volume: [] decreased     [x] WNL     [] Defect   PFM tension: [] decreased     [x] increased     [] WNL; mildly increased with initial palpation, voluntary relaxation good      Contraction ability:  Voluntary contraction: [] absent     [] weak     [x] moderate      [] strong  Voluntary relaxation: [] absent     [] partial     [x] complete  MMT: 2/5   PFM endurance: 8 seconds   Repetitions of endurance contractions: 6  Number of quick contractions in 10 seconds: 6  Quality of contractions: occasional breath holding; abdominal use with kegel    Tissue laxity test:  Anterior wall: [] minimum     [x] moderate     [] severe      [] WNL  Posterior wall: [x] minimum     [] moderate     [] severe      [] WNL    Palpation: via vaginal canal   Superficial Pelvic Floor Musculature (PFM): Tender? Intermediate PFM Tender? Deep PFM Tender?    Superficial Transverse Perineal [] Right  [] Left  []DNT Deep Transverse Perineal [] Right  [] Left  []DNT Puborectalis [] Right  [] Left  []DNT   Ischiocavernosus [] Right  [] Left  []DNT Compressor Urethra [] Right  [] Left  []DNT Pubococcygeus [] Right  [] Left  []DNT   Bulbocavernosus [] Right  [] Left  []DNT   Iliococcygeus [] Right  [] Left  []DNT       Obturator Internus [] Right  [] Left  []DNT       Coccygeus [] Right  [] Left  []DNT     Breath assessment:  Observation: A/P chest expansion and lateral rib expansion  Coordination of pelvic floor muscles with breath: moderate pelvic floor muscle excursion  Co-contraction of PFM with transversus abdominis: present; good co-contraction

## 2021-10-06 NOTE — PROGRESS NOTES
Alexi Mauricio  : 1945  [place]  Secondary: [address] at [facility]  [address], [address], [place]  [phone]   [phone]      OUTPATIENT PHYSICAL THERAPY: Daily Treatment Note 10/6/2021   Visit Count:  7  ICD-10: Treatment Diagnosis: Lack of coordination (muscle incoordination) (R27.8), Generalized weakness (M62.81), Mixed incontinence (Urge and stress incontinence) (N39.46) and Frequency of micturition (R35.0)  Precautions/Allergies:   Gluten and Milk containing products   TREATMENT PLAN:  Effective Dates: 2021 TO 2021 (90 days). Frequency/Duration: 1 time a week for 90 Day(s) MEDICAL/REFERRING DIAGNOSIS:  Stress incontinence [N39.3]   DATE OF ONSET: 3+ years  REFERRING PHYSICIAN: Manuel Loera DO  MD Orders: Evaluate and treat  Return MD Appointment: Not scheduled/after PT     Pre-treatment Symptoms/Complaints:  JENNIFER    She has been doing exercises about 2x/day this week due to being at a women's conference. She notes one small episode of leakage, getting up at night. Leakage was just a drop. Pain: Initial: Pain Intensity 1: 0  Post Session:  0/10   Medications Last Reviewed:  10/6/2021  Updated Objective Findings:  See discharge summary   TREATMENT:   THERAPEUTIC EXERCISE: (30 minutes):  Exercises per grid below to improve mobility, strength and coordination. Required minimal verbal and tactile cues to promote proper body breathing techniques and minimze accessory muscle use. Progressed resistance, range, repetitions and complexity of movement as indicated. All exercises performed with emphasis on kegel and breathing in order improve coordination, awareness and to minimize symptoms.     TE= therapeutic exercise, TA= therapeutic activity, MT= manual therapy, NE= neuro re-education   Date:  21 Date:  21 Date:  21 Date:  21 Date:  21 Date:  10/6/21 Activity/Exercise Parameters Parameters Parameters Parameters     PFM coordination/strength Holds and quick contractions with focus on coordination w/ digital palpation Holds and quick contractions with focus on coordination w/ digital palpation Holds and quick contractions w/ digital palpation; focusing on isolation and endurance 4H/5R Holds and quick contractions w/ digital palpation; focusing on isolation and endurance 6H/6R  PFM coordination; strength and endurance with digital palpation   HEP   Holds 4H/5R and QF x10, 3x/day; See below; holds 6H/6R and quick contractions 3x/day  reviewed   Diaphragmatic breathing 5 minutes        Adductor stretch 3x30s   3x30s B     Single knee to chest 3x30s B   2x30s B     Happy baby 3x30s   2x30s     Lumbar rotation  x10 w/ feet wide, add to HEP  2x20     Piriformis stretch    2x30s B     Stepper   7 minutes L1.5 7 minutes 8 minutes L1.5 8 minutes L1.5   Hip abduction   3x10 X20, lime x20, blue     Hip adduction     x20    Bridge    x15 2x15, lime x20 w/ ball hold (not squeezing)    Sit to stand   x10   2x10    Clamshell    X15, lime B     Rows    x15 on cables 14#     Sidestepping     2x40ft    Monster walking           THERAPEUTIC ACTIVITY: (15 minutes): Reviewed of bladder/bowel health and habits and effect on symptoms.  Discussed maintainence program to continue to maintain continence  TA Educational Topic Notes Date Completed   Pathology/Anatomy/PFM Function  8/18/21   Bladder health education   Reviewed  Review of bladder health/habits 8/25/21  9/1/21  10/6/21   Urinary urge suppression  9/1/21   The knack  9/1/21   Voiding strategies     Bowel/Bladder log     Bowel health education     Constipation care     Diarrhea/Fecal leakage     Colonic massage     Toilet positioning     Defecation dynamics     Sources of fiber     Return to intercourse/Dilator training     Sexual positioning     Lubricants/vaginal moisturizers     Vaginal irritants     Body mechanics Lifting mechanics  9/29/21   Posture/ergonomics     Diaphragmatic breathing     Resources and technology       MANUAL THERAPY: (10 minutes): Soft tissue mobilization was utilized and necessary because of the patient's restricted motion of soft tissue. Date Type Location Comments   10/6/2021 Internal assessment/treatment Via vaginal canal Single digital MT to all PFM layer to improve tissue mobility    STM adductors Skin rolling               (Used abbreviations: MET - muscle energy technique; SCS- Strain counter strain; CTM-Connective tissue mobilizations; CR- Contract/Relax; SP- Sustained pressure; PIT- Positional inhibition techniques; STM Soft -tissue mobilization; MM- Myofascial mobilization; TrP-Trigger point release; IASTM- Instrument assisted soft tissue mobilizations, TDN-Trigger point dry needling)    Pt gives verbal consent to internal vaginal assessment/treatment without chaperon present. Treatment/Session Summary:    · Response to Treatment:  Ms. Mena Manzo has been seen in skilled PT from 8/18/21 to 10/6/21. Patient has attended 7 out of 7 scheduled visits, with 0 cancellation(s) and 0 no shows. Treatment has emphasized PFM retraining/coordination, relaxation and awareness of PFM, core/PFM engagement during movement, urge suppression for improve urge control and decreased frequency and bowel/bladder health to improve understanding and awareness of effect on symptoms. Patient responded well to therapy, with improvements in urinary incontinence, urinary urge control, reduced urinary frequency and improve freedom and quality of life. Pt has achieved goals as indicated below and all questions have been answered to their satisfaction. Pt was invited to call with any further questions or concerns as needed.   · Communication/Consultation:  Continue with maintanence program as prescirbed, f/u as needed if symptoms return  · Equipment provided today:  None today  Total Treatment Billable Duration: 30 minutes TE, 15 minutes TA, 10 minutes MT  PT Patient Time In/Time Out  Time In: 1100  Time Out: 600 [facility], PT, DPT     Future Appointments   Date Time Provider You Tracy   12/3/2021  9:00 AM [facility] LAB [facility] CAF   12/10/2021  9:20 AM Zeny Sharif MD [facility]   9/7/2022  1:30 PM Yonathan Mcneil MD END BS ENDO

## 2021-10-13 ENCOUNTER — APPOINTMENT (OUTPATIENT)
Dept: PHYSICAL THERAPY | Age: 76
End: 2021-10-13
Payer: MEDICARE

## 2021-10-20 ENCOUNTER — APPOINTMENT (OUTPATIENT)
Dept: PHYSICAL THERAPY | Age: 76
End: 2021-10-20
Payer: MEDICARE

## 2021-10-27 ENCOUNTER — APPOINTMENT (OUTPATIENT)
Dept: PHYSICAL THERAPY | Age: 76
End: 2021-10-27
Payer: MEDICARE

## 2022-03-18 PROBLEM — N81.10 VAGINAL PROLAPSE: Status: ACTIVE | Noted: 2021-07-15

## 2022-03-18 PROBLEM — N39.3 STRESS INCONTINENCE: Status: ACTIVE | Noted: 2021-07-15

## 2022-03-19 PROBLEM — Z80.8 FAMILY HISTORY OF THYROID CANCER: Status: ACTIVE | Noted: 2020-09-08

## 2022-03-19 PROBLEM — N95.2 VAGINAL ATROPHY: Status: ACTIVE | Noted: 2021-07-15

## 2022-06-16 ENCOUNTER — OFFICE VISIT (OUTPATIENT)
Dept: INTERNAL MEDICINE CLINIC | Facility: CLINIC | Age: 77
End: 2022-06-16
Payer: MEDICARE

## 2022-06-16 VITALS
SYSTOLIC BLOOD PRESSURE: 114 MMHG | WEIGHT: 180.5 LBS | HEIGHT: 64 IN | HEART RATE: 96 BPM | RESPIRATION RATE: 16 BRPM | OXYGEN SATURATION: 96 % | DIASTOLIC BLOOD PRESSURE: 66 MMHG | BODY MASS INDEX: 30.81 KG/M2

## 2022-06-16 DIAGNOSIS — F41.8 DEPRESSION WITH ANXIETY: ICD-10-CM

## 2022-06-16 DIAGNOSIS — K21.9 GASTROESOPHAGEAL REFLUX DISEASE, UNSPECIFIED WHETHER ESOPHAGITIS PRESENT: ICD-10-CM

## 2022-06-16 DIAGNOSIS — K57.30 DIVERTICULOSIS OF COLON: ICD-10-CM

## 2022-06-16 DIAGNOSIS — E55.9 VITAMIN D DEFICIENCY: ICD-10-CM

## 2022-06-16 DIAGNOSIS — E78.00 PURE HYPERCHOLESTEROLEMIA: ICD-10-CM

## 2022-06-16 DIAGNOSIS — I10 ESSENTIAL HYPERTENSION: Primary | ICD-10-CM

## 2022-06-16 DIAGNOSIS — E03.9 PRIMARY HYPOTHYROIDISM: ICD-10-CM

## 2022-06-16 DIAGNOSIS — M25.512 PAIN IN JOINT OF LEFT SHOULDER: ICD-10-CM

## 2022-06-16 DIAGNOSIS — R73.01 IMPAIRED FASTING BLOOD SUGAR: ICD-10-CM

## 2022-06-16 PROCEDURE — 1036F TOBACCO NON-USER: CPT | Performed by: FAMILY MEDICINE

## 2022-06-16 PROCEDURE — G8400 PT W/DXA NO RESULTS DOC: HCPCS | Performed by: FAMILY MEDICINE

## 2022-06-16 PROCEDURE — 3017F COLORECTAL CA SCREEN DOC REV: CPT | Performed by: FAMILY MEDICINE

## 2022-06-16 PROCEDURE — G8417 CALC BMI ABV UP PARAM F/U: HCPCS | Performed by: FAMILY MEDICINE

## 2022-06-16 PROCEDURE — 1090F PRES/ABSN URINE INCON ASSESS: CPT | Performed by: FAMILY MEDICINE

## 2022-06-16 PROCEDURE — 1123F ACP DISCUSS/DSCN MKR DOCD: CPT | Performed by: FAMILY MEDICINE

## 2022-06-16 PROCEDURE — G8427 DOCREV CUR MEDS BY ELIG CLIN: HCPCS | Performed by: FAMILY MEDICINE

## 2022-06-16 PROCEDURE — 99214 OFFICE O/P EST MOD 30 MIN: CPT | Performed by: FAMILY MEDICINE

## 2022-06-16 RX ORDER — SUCRALFATE 1 G/1
1 TABLET ORAL 4 TIMES DAILY
COMMUNITY
End: 2022-06-16 | Stop reason: SDUPTHER

## 2022-06-16 RX ORDER — SUCRALFATE 1 G/1
1 TABLET ORAL 4 TIMES DAILY
Qty: 120 TABLET | Refills: 5 | Status: SHIPPED | OUTPATIENT
Start: 2022-06-16

## 2022-06-16 RX ORDER — METOPROLOL SUCCINATE 100 MG/1
100 TABLET, EXTENDED RELEASE ORAL DAILY
Qty: 90 TABLET | Refills: 3 | Status: SHIPPED | OUTPATIENT
Start: 2022-06-16

## 2022-06-16 RX ORDER — MELOXICAM 15 MG/1
15 TABLET ORAL DAILY
Qty: 90 TABLET | Refills: 3 | Status: SHIPPED | OUTPATIENT
Start: 2022-06-16

## 2022-06-16 RX ORDER — PANTOPRAZOLE SODIUM 40 MG/1
40 TABLET, DELAYED RELEASE ORAL DAILY
Qty: 90 TABLET | Refills: 3 | Status: SHIPPED | OUTPATIENT
Start: 2022-06-16

## 2022-06-16 RX ORDER — CITALOPRAM 20 MG/1
20 TABLET ORAL DAILY
Qty: 90 TABLET | Refills: 3 | Status: SHIPPED | OUTPATIENT
Start: 2022-06-16

## 2022-06-16 RX ORDER — LEVOTHYROXINE SODIUM 0.05 MG/1
50 TABLET ORAL DAILY
Qty: 90 TABLET | Refills: 3 | Status: SHIPPED | OUTPATIENT
Start: 2022-06-16 | End: 2022-09-07 | Stop reason: SDUPTHER

## 2022-06-16 RX ORDER — HYDROCHLOROTHIAZIDE 25 MG/1
25 TABLET ORAL DAILY
Qty: 90 TABLET | Refills: 3 | Status: SHIPPED | OUTPATIENT
Start: 2022-06-16

## 2022-06-16 RX ORDER — ATORVASTATIN CALCIUM 20 MG/1
20 TABLET, FILM COATED ORAL DAILY
Qty: 30 TABLET | Refills: 2 | Status: SHIPPED | OUTPATIENT
Start: 2022-06-16 | End: 2022-10-26

## 2022-06-16 RX ORDER — ALPRAZOLAM 1 MG/1
1 TABLET ORAL NIGHTLY PRN
Qty: 30 TABLET | Refills: 3 | Status: SHIPPED | OUTPATIENT
Start: 2022-06-16 | End: 2022-07-16

## 2022-06-16 RX ORDER — DICYCLOMINE HCL 20 MG
20 TABLET ORAL EVERY 6 HOURS PRN
Qty: 120 TABLET | Refills: 3 | Status: SHIPPED | OUTPATIENT
Start: 2022-06-16

## 2022-06-16 ASSESSMENT — PATIENT HEALTH QUESTIONNAIRE - PHQ9
8. MOVING OR SPEAKING SO SLOWLY THAT OTHER PEOPLE COULD HAVE NOTICED. OR THE OPPOSITE, BEING SO FIGETY OR RESTLESS THAT YOU HAVE BEEN MOVING AROUND A LOT MORE THAN USUAL: 0
5. POOR APPETITE OR OVEREATING: 0
7. TROUBLE CONCENTRATING ON THINGS, SUCH AS READING THE NEWSPAPER OR WATCHING TELEVISION: 0
1. LITTLE INTEREST OR PLEASURE IN DOING THINGS: 0
3. TROUBLE FALLING OR STAYING ASLEEP: 0
SUM OF ALL RESPONSES TO PHQ QUESTIONS 1-9: 0
SUM OF ALL RESPONSES TO PHQ9 QUESTIONS 1 & 2: 0
SUM OF ALL RESPONSES TO PHQ QUESTIONS 1-9: 0
6. FEELING BAD ABOUT YOURSELF - OR THAT YOU ARE A FAILURE OR HAVE LET YOURSELF OR YOUR FAMILY DOWN: 0
2. FEELING DOWN, DEPRESSED OR HOPELESS: 0
SUM OF ALL RESPONSES TO PHQ QUESTIONS 1-9: 0
9. THOUGHTS THAT YOU WOULD BE BETTER OFF DEAD, OR OF HURTING YOURSELF: 0
4. FEELING TIRED OR HAVING LITTLE ENERGY: 0
SUM OF ALL RESPONSES TO PHQ QUESTIONS 1-9: 0

## 2022-06-16 ASSESSMENT — ENCOUNTER SYMPTOMS
ABDOMINAL PAIN: 0
BLOOD IN STOOL: 0
SHORTNESS OF BREATH: 0

## 2022-06-16 NOTE — PROGRESS NOTES
6/16/2022     Subjective:     Chief Complaint   Patient presents with    Follow-up Chronic Condition     Hypothyroidism, Hypertension, Hyperlipidemia f/u taking meds, doing okay, taking meds, taking meds, Bp has been fluctuating, highest 190/100, taking meds, taking meds, trying with heatlhy diet only        HPI:     Hypertension   She is not exercising and is adherent to low salt diet. Cardiac symptoms: none. Cardiovascular risk factors: dyslipidemia and hypertension. Blood pressure is borderline controlled at home, ranging 120-190's/80-90's. Hypothyroidism  The patient is a 78 y.o. female who is seen for follow up of hypothyroidism. Current symptoms: none. Patient denies change in energy level, diarrhea, heat / cold intolerance, nervousness, palpitations and weight changes. Symptoms are stable and well controlled. The patient is compliant. .  Most recent lab:   Lab Results   Component Value Date    TSH 2.130 12/03/2021     Hyperlipidemia  The patient is following a low fat diet and is not exercising regularly. She is tolerating current treatment well and  is compliant. Patient is not having associated symptoms of shortness of breath, chest pain, rapid heart rate, irregular heart rate and dizziness    Patient labs are YOUR LAST LIPID PROFILE:   Lab Results   Component Value Date    CHOL 183 12/03/2021    HDL 46 12/03/2021    VLDL 25 12/03/2021       Anxiety    GERD - Has seen GI, had EGD and colonoscopy in 2020. Was advised to take pantoprazole 40mg BID but insurance would not cover. Has not been able to follow up since her procedure. Still having trouble with gas when she eats veggies and fruit. Needs a referral back. Chronic back and neck pain  Taking tylenol which doesn't really help her. Interim History:   Got COVID about 2 months ago, since then has had muscle aches. Knees seem to hurt more now than before. Review of Systems   Constitutional: Negative for fatigue.    HENT: Negative for congestion. Respiratory: Negative for shortness of breath. Cardiovascular: Negative for chest pain. Gastrointestinal: Negative for abdominal pain and blood in stool. Genitourinary: Negative for difficulty urinating. Skin: Negative for rash. Neurological: Negative for headaches. Past Medical History:   Diagnosis Date    Atrial fibrillation ([address]     Depression with anxiety     Diverticulosis of colon     Essential hypertension     GERD (gastroesophageal reflux disease)     Hashimoto's thyroiditis     Hypercholesterolemia     Migraine     Mitral valve prolapse     Multiple thyroid nodules     Peptic ulcer of stomach     Postmenopausal     Primary hypothyroidism     Spider telangiectasis      Past Surgical History:   Procedure Laterality Date    APPENDECTOMY  age 27   Tammy Pemberton BREAST BIOPSY      BREAST RECONSTRUCTION Bilateral 1990    CHOLECYSTECTOMY  2008    COLONOSCOPY      COLONOSCOPY N/A 2/15/2021    COLONOSCOPY/ BMI 30 performed by Janet Campos MD at [address]  06/2014    sinus surgery    HERNIA REPAIR  age 41    HYSTERECTOMY ([facility])  age 30   Tammy Pemberton UPPER GASTROINTESTINAL ENDOSCOPY  3/2016     Family History   Problem Relation Age of Onset    Lung Cancer Father     Thyroid Cancer Maternal Cousin     Breast Cancer Maternal Aunt     Breast Cancer Maternal Cousin     Heart Disease Mother     Broken Bones Mother      Social History     Socioeconomic History    Marital status:       Spouse name: None    Number of children: None    Years of education: None    Highest education level: None   Occupational History    None   Tobacco Use    Smoking status: Never Smoker    Smokeless tobacco: Never Used   Substance and Sexual Activity    Alcohol use: No    Drug use: No    Sexual activity: None   Other Topics Concern    None   Social History Narrative    None     Social Determinants of Health     Financial Resource Strain:     Difficulty of Paying Living Expenses: Not on file   Food Insecurity:     Worried About Running Out of Food in the Last Year: Not on file    Kathryn of Food in the Last Year: Not on file   Transportation Needs:     Lack of Transportation (Medical): Not on file    Lack of Transportation (Non-Medical): Not on file   Physical Activity:     Days of Exercise per Week: Not on file    Minutes of Exercise per Session: Not on file   Stress:     Feeling of Stress : Not on file   Social Connections:     Frequency of Communication with Friends and Family: Not on file    Frequency of Social Gatherings with Friends and Family: Not on file    Attends Christianity Services: Not on file    Active Member of [address] [place] [address] or Organizations: Not on file    Attends Club or Organization Meetings: Not on file    Marital Status: Not on file   Intimate Partner Violence:     Fear of Current or Ex-Partner: Not on file    Emotionally Abused: Not on file    Physically Abused: Not on file    Sexually Abused: Not on file   Housing Stability:     Unable to Pay for Housing in the Last Year: Not on file    Number of [place] in the Last Year: Not on file    Unstable Housing in the Last Year: Not on file      Current Outpatient Medications   Medication Sig Dispense Refill    metoprolol succinate (TOPROL XL) 100 MG extended release tablet Take 1 tablet by mouth daily 90 tablet 3    pantoprazole (PROTONIX) 40 MG tablet Take 1 tablet by mouth daily 90 tablet 3    sucralfate (CARAFATE) 1 GM tablet Take 1 tablet by mouth 4 times daily 120 tablet 5    ALPRAZolam (XANAX) 1 MG tablet Take 1 tablet by mouth nightly as needed for Sleep or Anxiety for up to 30 days.  30 tablet 3    atorvastatin (LIPITOR) 20 MG tablet Take 1 tablet by mouth daily 30 tablet 2    citalopram (CELEXA) 20 MG tablet Take 1 tablet by mouth daily 90 tablet 3    dicyclomine (BENTYL) 20 MG tablet Take 1 tablet by mouth every 6 hours as needed (abdominal cramps) 120 tablet 3    hydroCHLOROthiazide (HYDRODIURIL) 25 MG tablet Take 1 tablet by mouth daily 90 tablet 3    levothyroxine (SYNTHROID) 50 MCG tablet Take 1 tablet by mouth Daily 1 tablet daily 6 days/week 90 tablet 3    meloxicam (MOBIC) 15 MG tablet Take 1 tablet by mouth daily 1 tab daily 90 tablet 3    aspirin 81 MG EC tablet Take 81 mg by mouth daily      Cholecalciferol 50 MCG (2000 UT) TABS Take by mouth      mupirocin (BACTROBAN) 2 % ointment Apply topically daily      SUMAtriptan (IMITREX) 100 MG tablet Take 100 mg by mouth as needed       No current facility-administered medications for this visit. Allergies   Allergen Reactions    Gluten Meal Nausea Only    Milk Protein Nausea Only       Objective:   /66 (Site: Left Upper Arm, Position: Sitting)   Pulse 96   Resp 16   Ht 5' 4\" (1.626 m)   Wt 180 lb 8 oz (81.9 kg)   SpO2 96%   BMI 30.98 kg/m²     Physical Exam  HENT:      Head: Normocephalic. Cardiovascular:      Rate and Rhythm: Normal rate. Pulmonary:      Effort: Pulmonary effort is normal.   Skin:     Findings: No rash. Neurological:      General: No focal deficit present. Mental Status: She is alert. Assessment and Plan:      Diagnosis Orders   1. Essential hypertension  TSH    metoprolol succinate (TOPROL XL) 100 MG extended release tablet    hydroCHLOROthiazide (HYDRODIURIL) 25 MG tablet    TSH   2. Diverticulosis of colon     3. Gastroesophageal reflux disease, unspecified whether esophagitis present  pantoprazole (PROTONIX) 40 MG tablet    sucralfate (CARAFATE) 1 GM tablet    dicyclomine (BENTYL) 20 MG tablet    ANUEL - Larisa Velarde MD, Gastroenterology   4. Primary hypothyroidism  levothyroxine (SYNTHROID) 50 MCG tablet   5. Pure hypercholesterolemia  CBC with Auto Differential    Comprehensive Metabolic Panel    Lipid Panel    atorvastatin (LIPITOR) 20 MG tablet    Lipid Panel    Comprehensive Metabolic Panel    CBC with Auto Differential   6. Depression with anxiety  ALPRAZolam (XANAX) 1 MG tablet    citalopram (CELEXA) 20 MG tablet   7. Pain in joint of left shoulder  meloxicam (MOBIC) 15 MG tablet   8. Impaired fasting blood sugar  Hemoglobin A1C    Hemoglobin A1C   9. Vitamin D deficiency  Vitamin D 25 Hydroxy    Vitamin D 25 Hydroxy     Data reviewed:  Previous notes, labs and Specialists notes, if any, reviewed and discussed with patient. Blood work today I will call him with results. HTN stable on meds  Diverticulosis and GERD - with increasing symptoms of bloating, will refer back to GI for further evaluation. Increase pantoprazole to twice a day and continue sucralfate. TSH is therapeutic. Hyperlipidemia continue lifestyle changes continue meds. Depression with anxiety stable however she is undergoing a lot of stress. Pain in joint of left shoulder continue meloxicam as prescribed. IFG no meds for now but she needs to continue lifestyle changes. Continue vitamin D supplement. Advised to continue lifestyle changes: stay as active as possible, well balanced diet rich in grains, fruits and vegetables, get at least 6-8 hrs of sleep a night. Stay socially involved. Opportunity to ask questions was offered and were answered to the best of my ability. Follow-up will be in 6 months for her wellness visit. Labs prior. Over 50% of today's office visit was spent in face to face time reviewing test results, prognosis, importance of compliance, education about disease process, benefits of medications, instructions for management of disease and follow up plans. Total face to face time spent with patient was at least 25 minutes      There are no Patient Instructions on file for this visit. No follow-up provider specified.     Enzo Lux MD

## 2022-06-17 LAB
25(OH)D3 SERPL-MCNC: 88.2 NG/ML (ref 30–100)
ALBUMIN SERPL-MCNC: 3.9 G/DL (ref 3.2–4.6)
ALBUMIN/GLOB SERPL: 1.1 {RATIO} (ref 1.2–3.5)
ALP SERPL-CCNC: 126 U/L (ref 50–136)
ALT SERPL-CCNC: 21 U/L (ref 12–65)
ANION GAP SERPL CALC-SCNC: 6 MMOL/L (ref 7–16)
AST SERPL-CCNC: 14 U/L (ref 15–37)
BASOPHILS # BLD: 0.1 K/UL (ref 0–0.2)
BASOPHILS NFR BLD: 1 % (ref 0–2)
BILIRUB SERPL-MCNC: 0.7 MG/DL (ref 0.2–1.1)
BUN SERPL-MCNC: 20 MG/DL (ref 8–23)
CALCIUM SERPL-MCNC: 10.3 MG/DL (ref 8.3–10.4)
CHLORIDE SERPL-SCNC: 103 MMOL/L (ref 98–107)
CHOLEST SERPL-MCNC: 166 MG/DL
CO2 SERPL-SCNC: 30 MMOL/L (ref 21–32)
CREAT SERPL-MCNC: 1 MG/DL (ref 0.6–1)
DIFFERENTIAL METHOD BLD: ABNORMAL
EOSINOPHIL # BLD: 0.4 K/UL (ref 0–0.8)
EOSINOPHIL NFR BLD: 6 % (ref 0.5–7.8)
ERYTHROCYTE [DISTWIDTH] IN BLOOD BY AUTOMATED COUNT: 13.3 % (ref 11.9–14.6)
EST. AVERAGE GLUCOSE BLD GHB EST-MCNC: 123 MG/DL
GLOBULIN SER CALC-MCNC: 3.5 G/DL (ref 2.3–3.5)
GLUCOSE SERPL-MCNC: 96 MG/DL (ref 65–100)
HBA1C MFR BLD: 5.9 % (ref 4.2–6.3)
HCT VFR BLD AUTO: 43.8 % (ref 35.8–46.3)
HDLC SERPL-MCNC: 43 MG/DL (ref 40–60)
HDLC SERPL: 3.9 {RATIO}
HGB BLD-MCNC: 13.6 G/DL (ref 11.7–15.4)
IMM GRANULOCYTES # BLD AUTO: 0 K/UL (ref 0–0.5)
IMM GRANULOCYTES NFR BLD AUTO: 0 % (ref 0–5)
LDLC SERPL CALC-MCNC: 78.6 MG/DL
LYMPHOCYTES # BLD: 1.5 K/UL (ref 0.5–4.6)
LYMPHOCYTES NFR BLD: 22 % (ref 13–44)
MCH RBC QN AUTO: 29.2 PG (ref 26.1–32.9)
MCHC RBC AUTO-ENTMCNC: 31.1 G/DL (ref 31.4–35)
MCV RBC AUTO: 94 FL (ref 79.6–97.8)
MONOCYTES # BLD: 0.6 K/UL (ref 0.1–1.3)
MONOCYTES NFR BLD: 9 % (ref 4–12)
NEUTS SEG # BLD: 4.1 K/UL (ref 1.7–8.2)
NEUTS SEG NFR BLD: 62 % (ref 43–78)
NRBC # BLD: 0 K/UL (ref 0–0.2)
PLATELET # BLD AUTO: 275 K/UL (ref 150–450)
PMV BLD AUTO: 10.7 FL (ref 9.4–12.3)
POTASSIUM SERPL-SCNC: 4.1 MMOL/L (ref 3.5–5.1)
PROT SERPL-MCNC: 7.4 G/DL (ref 6.3–8.2)
RBC # BLD AUTO: 4.66 M/UL (ref 4.05–5.2)
SODIUM SERPL-SCNC: 139 MMOL/L (ref 136–145)
TRIGL SERPL-MCNC: 222 MG/DL (ref 35–150)
TSH, 3RD GENERATION: 1.73 UIU/ML (ref 0.36–3.74)
VLDLC SERPL CALC-MCNC: 44.4 MG/DL (ref 6–23)
WBC # BLD AUTO: 6.6 K/UL (ref 4.3–11.1)

## 2022-08-29 DIAGNOSIS — N89.8 VAGINAL LESION: Primary | ICD-10-CM

## 2022-08-29 RX ORDER — KETOCONAZOLE 20 MG/G
CREAM TOPICAL
Qty: 60 G | Refills: 0 | Status: SHIPPED | OUTPATIENT
Start: 2022-08-29

## 2022-08-29 NOTE — TELEPHONE ENCOUNTER
Both creams were prescribed for vaginal lesion for different time. [id number] [address] for both refills? ???       Requested Prescriptions     Pending Prescriptions Disp Refills    mupirocin (BACTROBAN) 2 % ointment 22 g 0     Sig: Apply topically daily    ketoconazole (NIZORAL) 2 % cream 60 g 0     Sig: Apply  to affected area daily. - Topical     Dose verified and to [facility]

## 2022-08-29 NOTE — TELEPHONE ENCOUNTER
PATIENT REQUEST REFILL    RX ketoconazole  2% cream   RX - mupriocin 2%    [facility]

## 2022-09-07 ENCOUNTER — OFFICE VISIT (OUTPATIENT)
Dept: ENDOCRINOLOGY | Age: 77
End: 2022-09-07
Payer: MEDICARE

## 2022-09-07 VITALS
HEIGHT: 64 IN | SYSTOLIC BLOOD PRESSURE: 128 MMHG | HEART RATE: 80 BPM | DIASTOLIC BLOOD PRESSURE: 88 MMHG | WEIGHT: 180.2 LBS | BODY MASS INDEX: 30.77 KG/M2 | OXYGEN SATURATION: 98 %

## 2022-09-07 DIAGNOSIS — E04.2 MULTIPLE THYROID NODULES: ICD-10-CM

## 2022-09-07 DIAGNOSIS — Z80.8 FAMILY HISTORY OF THYROID CANCER: ICD-10-CM

## 2022-09-07 DIAGNOSIS — E03.9 PRIMARY HYPOTHYROIDISM: Primary | ICD-10-CM

## 2022-09-07 DIAGNOSIS — E06.3 HASHIMOTO'S THYROIDITIS: ICD-10-CM

## 2022-09-07 PROCEDURE — G8400 PT W/DXA NO RESULTS DOC: HCPCS | Performed by: INTERNAL MEDICINE

## 2022-09-07 PROCEDURE — 1036F TOBACCO NON-USER: CPT | Performed by: INTERNAL MEDICINE

## 2022-09-07 PROCEDURE — 1090F PRES/ABSN URINE INCON ASSESS: CPT | Performed by: INTERNAL MEDICINE

## 2022-09-07 PROCEDURE — 99214 OFFICE O/P EST MOD 30 MIN: CPT | Performed by: INTERNAL MEDICINE

## 2022-09-07 PROCEDURE — 76536 US EXAM OF HEAD AND NECK: CPT | Performed by: INTERNAL MEDICINE

## 2022-09-07 PROCEDURE — G8427 DOCREV CUR MEDS BY ELIG CLIN: HCPCS | Performed by: INTERNAL MEDICINE

## 2022-09-07 PROCEDURE — G8417 CALC BMI ABV UP PARAM F/U: HCPCS | Performed by: INTERNAL MEDICINE

## 2022-09-07 PROCEDURE — 3017F COLORECTAL CA SCREEN DOC REV: CPT | Performed by: INTERNAL MEDICINE

## 2022-09-07 PROCEDURE — 1123F ACP DISCUSS/DSCN MKR DOCD: CPT | Performed by: INTERNAL MEDICINE

## 2022-09-07 RX ORDER — LEVOTHYROXINE SODIUM 0.05 MG/1
50 TABLET ORAL
Qty: 77 TABLET | Refills: 3
Start: 2022-09-07

## 2022-09-07 ASSESSMENT — ENCOUNTER SYMPTOMS
CONSTIPATION: 0
TROUBLE SWALLOWING: 0
DIARRHEA: 0
VOICE CHANGE: 1

## 2022-09-07 NOTE — PROGRESS NOTES
Shantel Swartz MD, [address] [place] [address]            Reason for visit: Follow-up of hypothyroidism and thyroid nodules      ASSESSMENT AND PLAN:    1. Primary hypothyroidism  She is biochemically euthyroid. Continue levothyroxine as currently prescribed. - levothyroxine (SYNTHROID) 50 MCG tablet; Take 1 tablet by mouth Six times weekly  Dispense: 77 tablet; Refill: 3  - TSH; Future  - T4, Free; Future    2. Hashimoto's thyroiditis    3. Multiple thyroid nodules  Ultrasound was repeated today and is unchanged. Because of her family history of thyroid cancer, indefinite ultrasound surveillance is indicated. Next ultrasound should be repeated in 2 to 3 years.  - US,HEAD/NECK TISSUES,REAL TIME    4. Family history of thyroid cancer        PROCEDURES:    HEAD AND NECK ULTRASOUND    Date of study:  9/7/2022    Performing/interpreting physician:  Shantel Swartz MD, FACE    Indication:  thyroid nodule    Technique:  Using a 12 MHz linear transducer, multiple real-time planar images were obtained of the thyroid and surrounding tissues. Findings:  Right lobe 1.82 x 1.52 x 4.80 cm, isthmus 0.44 cm, left lobe 1.19 x 1.18 x 4.72 cm. Heterogeneous echotexture. Normal blood flow. 0.35 x 0.39 x 0.44 cm complex isoechoic nodule without calcifications or increased blood flow in the upper to midportion of the right lobe. 0.43 x 0.68 x 0.73 cm slightly hypoechoic nodule without calcifications for increased blood flow at the right edge of the isthmus. Impression: Multiple thyroid nodules as noted. Follow-up and Dispositions    Return in about 1 year (around 9/7/2023). History of Present Illness:    THYROID NODULES  Sadie Quinn is seen today in the [place] [address] for follow-up of thyroid nodules. She has been aware of this since 2013. The initial ultrasound was ordered as part of the evaluation of thyromegaly.      Symptoms: See review of systems below     Risk factors for malignancy: There is not a history of radiation to the head/neck other than medical/dental imaging. The patient does have a family history of thyroid cancer (maternal cousin). Prior imagin2013: Ultrasound ([address] [place] [address])- Right lobe 4.9 x 1.8 x 1.5 cm, isthmus 0.3 cm, left lobe 4.9 x 1.5 x 1.4 cm. Heterogeneous echotexture. 0.6 cm hypoechoic nodule at the right upper pole. 1.2 cm hypoechoic nodule in the midportion of the right lobe. 0.7 cm solid nodule at the left upper pole. 0.8 cm solid nodule at the left lower pole. 2016: Ultrasound ([facility])- Right lobe 1.6 x 1.4 x 3.4 cm, isthmus 0.2 cm, left lobe 1.1 x 1.3 x 2.8 cm.  0.4 cm hyperechoic nodule in the right lobe. 0.6 x 0.5 x 0.6 cm isoechoic nodule in the left lobe. 2017: Ultrasound ([facility])- Right lobe 1.6 x 1.5 x 3.4 cm, isthmus 0.4 cm, left lobe 1.3 x 1.0 x 2.9 cm. Homogeneous echotexture. 0.3 cm nodule without calcifications in the right lobe. 0.7 x 0.4 x 0.7 cm nodule at the junction of the right lobe and isthmus. 0.6 x 0.5 x 0.6 cm isoechoic/mildly hyperechoic nodule in the left lobe. 2020: Ultrasound ([place])- Right lobe 1.65 x 1.51 x 5.01 cm, isthmus 0.36 cm, left lobe 1.45 x 1.21 x 4.27 cm. Heterogeneous echotexture. Normal blood flow. 0.23 x 0.32 x 0.37 cm hypoechoic nodule without calcifications or increased blood flow in the upper to midportion of the right lobe. 0.82 x 0.73 x 0.92 cm isoechoic nodule without calcifications or increased blood flow in the mid to lower portion of the right lobe. 0.26 x 0.30 x 0.38 cm hypoechoic/anechoic nodule/cyst at the left upper pole. 2021: Ultrasound ([place])- Right lobe 1.86 x 1.54 x 3.89 cm, isthmus 0.55 cm, left lobe 1.79 x 1.21 x 4.50 cm. Heterogeneous echotexture. Normal blood flow. 0.31 x 0.28 x 0.38 cm hypoechoic nodule without calcifications or increased blood flow in the upper to midportion of the right lobe.  0.47 x 0.70 x 0.83 cm slightly hypoechoic nodule without calcifications or increased blood flow at the junction of the right lobe and isthmus. 11/18/2021: Ultrasound ([place])- Right lobe 1.94 x 1.46 x 5.38 cm, isthmus 0.46 cm, left lobe 1.55 x 1.06 x 4.51 cm. Heterogeneous echotexture. Normal blood flow. 0.29 x 0.27 x 0.41 cm hypoechoic nodule without calcifications or increased blood flow in the upper to midportion of the right lobe. 0.46 x 0.76 x 0.81 cm slightly hypoechoic nodule without calcifications or increased blood flow at the right edge of the isthmus. Several superficial lymph nodes with clear fatty pat in the right level IIA, superior to the thyroid. The largest measures 1.36 cm in long axis. No neck masses or sonographically abnormal lymph nodes. 9/7/2022: Ultrasound ([place])- Right lobe 1.82 x 1.52 x 4.80 cm, isthmus 0.44 cm, left lobe 1.19 x 1.18 x 4.72 cm. Heterogeneous echotexture. Normal blood flow. 0.35 x 0.39 x 0.44 cm complex isoechoic nodule without calcifications or increased blood flow in the upper to midportion of the right lobe. 0.43 x 0.68 x 0.73 cm slightly hypoechoic nodule without calcifications for increased blood flow at the right edge of the isthmus. Prior laboratory evaluation: See below     Prior biopsies: None        THYROID DYSFUNCTION  Candie Li is seen for follow-up of hypothyroidism; this was diagnosed in ~2013. Current symptoms: See review of systems below     Prior treatment: She has been on levothyroxine since diagnosis (50 mcg daily 5 days/week and 25 mcg daily 2 days/week since ~2013). Her dose has been adjusted as follows:  -50 mcg daily 6 days/week 9/8/2020     Pertinent labs:  8/21/2015: TSH 1.820.  4/15/2016: TSH 2.290.  5/6/2016: TSH 1.62, free T4 1. 19.  7/21/2016: TSH 2.180.  11/2/2016: TSH 1.24, free T4 1.39.  11/30/2016: TSH 2.710.  1/13/2017: TSH 2.48, free T4 1. 10.  4/7/2017: TSH 1.850.  5/23/2017: TSH 1.68, free T4 0.99.  10/12/2017: TSH 2.020.  9/8/2020: TSH 2.180, free T4 1.32.  7/8/2021: TSH 3.220.  12/3/2021: TSH 2.130.  6/16/2022: TSH 1.730. Review of Systems   Constitutional:  Positive for activity change (less active than before), fatigue and unexpected weight change (gained ~15 pounds in 2-3 years). HENT:  Positive for voice change (hoarseness). Negative for trouble swallowing. Cardiovascular:  Positive for palpitations (only with exertion). Gastrointestinal:  Negative for constipation and diarrhea. Psychiatric/Behavioral:  Negative for sleep disturbance. /88   Pulse 80   Ht 5' 4\" (1.626 m)   Wt 180 lb 3.2 oz (81.7 kg)   SpO2 98%   BMI 30.93 kg/m²   Wt Readings from Last 3 Encounters:   09/07/22 180 lb 3.2 oz (81.7 kg)   06/16/22 180 lb 8 oz (81.9 kg)   12/21/21 175 lb (79.4 kg)       Physical Exam  HENT:      Head: Normocephalic. Neck:      Thyroid: No thyroid mass or thyromegaly. Cardiovascular:      Rate and Rhythm: Normal rate. Pulmonary:      Effort: No respiratory distress. Breath sounds: Normal breath sounds. Neurological:      Mental Status: She is alert.    Psychiatric:         Mood and Affect: Mood normal.         Behavior: Behavior normal.       Orders Placed This Encounter   Procedures    US,HEAD/NECK TISSUES,REAL TIME    TSH     Standing Status:   Future     Standing Expiration Date:   9/7/2024    T4, Free     Standing Status:   Future     Standing Expiration Date:   9/7/2024         Current Outpatient Medications   Medication Sig Dispense Refill    levothyroxine (SYNTHROID) 50 MCG tablet Take 1 tablet by mouth Six times weekly 77 tablet 3    mupirocin (BACTROBAN) 2 % ointment Apply topically daily 22 g 0    ketoconazole (NIZORAL) 2 % cream Apply  to affected area daily. - Topical 60 g 0    metoprolol succinate (TOPROL XL) 100 MG extended release tablet Take 1 tablet by mouth daily 90 tablet 3    pantoprazole (PROTONIX) 40 MG tablet Take 1 tablet by mouth daily 90 tablet 3    sucralfate (CARAFATE) 1 GM tablet Take 1 tablet by mouth 4 times daily 120 tablet 5    atorvastatin (LIPITOR) 20 MG tablet Take 1 tablet by mouth daily 30 tablet 2    citalopram (CELEXA) 20 MG tablet Take 1 tablet by mouth daily 90 tablet 3    dicyclomine (BENTYL) 20 MG tablet Take 1 tablet by mouth every 6 hours as needed (abdominal cramps) 120 tablet 3    hydroCHLOROthiazide (HYDRODIURIL) 25 MG tablet Take 1 tablet by mouth daily 90 tablet 3    meloxicam (MOBIC) 15 MG tablet Take 1 tablet by mouth daily 1 tab daily 90 tablet 3    aspirin 81 MG EC tablet Take 81 mg by mouth daily      Cholecalciferol 50 MCG (2000 UT) TABS Take by mouth      SUMAtriptan (IMITREX) 100 MG tablet Take 100 mg by mouth as needed       No current facility-administered medications for this visit. Jodi Nicolas MD, FACE      Portions of this note were generated with the assistance of voice recognition software. As such, some errors in transcription may be present.

## 2022-09-21 ENCOUNTER — TELEPHONE (OUTPATIENT)
Dept: INTERNAL MEDICINE CLINIC | Facility: CLINIC | Age: 77
End: 2022-09-21

## 2022-09-21 NOTE — TELEPHONE ENCOUNTER
PATIENT REQUEST REFILL    RX- IMITREX  100MG     PHARMACY - [facility] [address]

## 2022-09-23 DIAGNOSIS — E03.9 PRIMARY HYPOTHYROIDISM: ICD-10-CM

## 2022-09-23 RX ORDER — LEVOTHYROXINE SODIUM 0.05 MG/1
TABLET ORAL
Qty: 78 TABLET | Refills: 3 | OUTPATIENT
Start: 2022-09-23

## 2022-09-26 DIAGNOSIS — G43.709 CHRONIC MIGRAINE WITHOUT AURA, NOT INTRACTABLE, WITHOUT STATUS MIGRAINOSUS: Primary | ICD-10-CM

## 2022-09-26 RX ORDER — SUMATRIPTAN 100 MG/1
100 TABLET, FILM COATED ORAL PRN
Qty: 10 TABLET | Refills: 6 | Status: SHIPPED | OUTPATIENT
Start: 2022-09-26

## 2022-09-26 NOTE — TELEPHONE ENCOUNTER
Requested Prescriptions     Pending Prescriptions Disp Refills    SUMAtriptan (IMITREX) 100 MG tablet 10 tablet 6     Sig: Take 1 tablet by mouth as needed for Migraine     [facility] Patient is scheduled for follow up visit

## 2022-10-26 ENCOUNTER — TELEMEDICINE (OUTPATIENT)
Dept: INTERNAL MEDICINE CLINIC | Facility: CLINIC | Age: 77
End: 2022-10-26
Payer: MEDICARE

## 2022-10-26 ENCOUNTER — HOSPITAL ENCOUNTER (EMERGENCY)
Age: 77
Discharge: HOME | End: 2022-10-27
Attending: EMERGENCY MEDICINE
Payer: MEDICARE

## 2022-10-26 ENCOUNTER — TELEPHONE (OUTPATIENT)
Dept: INTERNAL MEDICINE CLINIC | Facility: CLINIC | Age: 77
End: 2022-10-26

## 2022-10-26 DIAGNOSIS — R11.2 NAUSEA AND VOMITING, UNSPECIFIED VOMITING TYPE: ICD-10-CM

## 2022-10-26 DIAGNOSIS — J11.1 INFLUENZA: Primary | ICD-10-CM

## 2022-10-26 DIAGNOSIS — R11.2 NAUSEA VOMITING AND DIARRHEA: ICD-10-CM

## 2022-10-26 DIAGNOSIS — J10.1 INFLUENZA A: ICD-10-CM

## 2022-10-26 DIAGNOSIS — E86.0 DEHYDRATION: ICD-10-CM

## 2022-10-26 DIAGNOSIS — R19.7 NAUSEA VOMITING AND DIARRHEA: ICD-10-CM

## 2022-10-26 DIAGNOSIS — E86.0 DEHYDRATION: Primary | ICD-10-CM

## 2022-10-26 LAB
INFLUENZA A ANTIGEN, POC: POSITIVE
INFLUENZA B ANTIGEN, POC: NEGATIVE
VALID INTERNAL CONTROL, POC: YES

## 2022-10-26 PROCEDURE — 1090F PRES/ABSN URINE INCON ASSESS: CPT | Performed by: FAMILY MEDICINE

## 2022-10-26 PROCEDURE — 96361 HYDRATE IV INFUSION ADD-ON: CPT | Performed by: EMERGENCY MEDICINE

## 2022-10-26 PROCEDURE — G8417 CALC BMI ABV UP PARAM F/U: HCPCS | Performed by: FAMILY MEDICINE

## 2022-10-26 PROCEDURE — 1036F TOBACCO NON-USER: CPT | Performed by: FAMILY MEDICINE

## 2022-10-26 PROCEDURE — 99284 EMERGENCY DEPT VISIT MOD MDM: CPT | Performed by: EMERGENCY MEDICINE

## 2022-10-26 PROCEDURE — 96374 THER/PROPH/DIAG INJ IV PUSH: CPT | Performed by: EMERGENCY MEDICINE

## 2022-10-26 PROCEDURE — G8400 PT W/DXA NO RESULTS DOC: HCPCS | Performed by: FAMILY MEDICINE

## 2022-10-26 PROCEDURE — G8484 FLU IMMUNIZE NO ADMIN: HCPCS | Performed by: FAMILY MEDICINE

## 2022-10-26 PROCEDURE — 3017F COLORECTAL CA SCREEN DOC REV: CPT | Performed by: FAMILY MEDICINE

## 2022-10-26 PROCEDURE — 87804 INFLUENZA ASSAY W/OPTIC: CPT | Performed by: FAMILY MEDICINE

## 2022-10-26 PROCEDURE — G8427 DOCREV CUR MEDS BY ELIG CLIN: HCPCS | Performed by: FAMILY MEDICINE

## 2022-10-26 PROCEDURE — 1123F ACP DISCUSS/DSCN MKR DOCD: CPT | Performed by: FAMILY MEDICINE

## 2022-10-26 PROCEDURE — 99214 OFFICE O/P EST MOD 30 MIN: CPT | Performed by: FAMILY MEDICINE

## 2022-10-26 PROCEDURE — 96375 TX/PRO/DX INJ NEW DRUG ADDON: CPT | Performed by: EMERGENCY MEDICINE

## 2022-10-26 RX ORDER — OSELTAMIVIR PHOSPHATE 6 MG/ML
75 FOR SUSPENSION ORAL 2 TIMES DAILY
Qty: 125 ML | Refills: 0 | Status: SHIPPED | OUTPATIENT
Start: 2022-10-26 | End: 2022-10-31

## 2022-10-26 RX ORDER — OSELTAMIVIR PHOSPHATE 75 MG/1
75 CAPSULE ORAL 2 TIMES DAILY
Qty: 10 CAPSULE | Refills: 0 | Status: SHIPPED | OUTPATIENT
Start: 2022-10-26 | End: 2022-10-26

## 2022-10-26 RX ORDER — ONDANSETRON HYDROCHLORIDE 8 MG/1
8 TABLET, FILM COATED ORAL EVERY 8 HOURS PRN
Qty: 15 TABLET | Refills: 1 | Status: SHIPPED | OUTPATIENT
Start: 2022-10-26

## 2022-10-26 SDOH — ECONOMIC STABILITY: FOOD INSECURITY: WITHIN THE PAST 12 MONTHS, THE FOOD YOU BOUGHT JUST DIDN'T LAST AND YOU DIDN'T HAVE MONEY TO GET MORE.: NEVER TRUE

## 2022-10-26 SDOH — ECONOMIC STABILITY: FOOD INSECURITY: WITHIN THE PAST 12 MONTHS, YOU WORRIED THAT YOUR FOOD WOULD RUN OUT BEFORE YOU GOT MONEY TO BUY MORE.: NEVER TRUE

## 2022-10-26 ASSESSMENT — PATIENT HEALTH QUESTIONNAIRE - PHQ9
SUM OF ALL RESPONSES TO PHQ QUESTIONS 1-9: 0
DEPRESSION UNABLE TO ASSESS: PT REFUSES
2. FEELING DOWN, DEPRESSED OR HOPELESS: 0
3. TROUBLE FALLING OR STAYING ASLEEP: 0
10. IF YOU CHECKED OFF ANY PROBLEMS, HOW DIFFICULT HAVE THESE PROBLEMS MADE IT FOR YOU TO DO YOUR WORK, TAKE CARE OF THINGS AT HOME, OR GET ALONG WITH OTHER PEOPLE: 0
9. THOUGHTS THAT YOU WOULD BE BETTER OFF DEAD, OR OF HURTING YOURSELF: 0
1. LITTLE INTEREST OR PLEASURE IN DOING THINGS: 0
SUM OF ALL RESPONSES TO PHQ9 QUESTIONS 1 & 2: 0
5. POOR APPETITE OR OVEREATING: 0
8. MOVING OR SPEAKING SO SLOWLY THAT OTHER PEOPLE COULD HAVE NOTICED. OR THE OPPOSITE, BEING SO FIGETY OR RESTLESS THAT YOU HAVE BEEN MOVING AROUND A LOT MORE THAN USUAL: 0
6. FEELING BAD ABOUT YOURSELF - OR THAT YOU ARE A FAILURE OR HAVE LET YOURSELF OR YOUR FAMILY DOWN: 0
SUM OF ALL RESPONSES TO PHQ QUESTIONS 1-9: 0
SUM OF ALL RESPONSES TO PHQ QUESTIONS 1-9: 0
4. FEELING TIRED OR HAVING LITTLE ENERGY: 0
SUM OF ALL RESPONSES TO PHQ QUESTIONS 1-9: 0
7. TROUBLE CONCENTRATING ON THINGS, SUCH AS READING THE NEWSPAPER OR WATCHING TELEVISION: 0

## 2022-10-26 ASSESSMENT — PAIN SCALES - GENERAL: PAINLEVEL_OUTOF10: 6

## 2022-10-26 ASSESSMENT — PAIN DESCRIPTION - LOCATION: LOCATION: THROAT;HEAD

## 2022-10-26 ASSESSMENT — PAIN DESCRIPTION - ORIENTATION: ORIENTATION: RIGHT

## 2022-10-26 ASSESSMENT — LIFESTYLE VARIABLES
HOW OFTEN DO YOU HAVE A DRINK CONTAINING ALCOHOL: NEVER
HOW MANY STANDARD DRINKS CONTAINING ALCOHOL DO YOU HAVE ON A TYPICAL DAY: PATIENT DOES NOT DRINK

## 2022-10-26 ASSESSMENT — PAIN - FUNCTIONAL ASSESSMENT
PAIN_FUNCTIONAL_ASSESSMENT: ACTIVITIES ARE NOT PREVENTED
PAIN_FUNCTIONAL_ASSESSMENT: 0-10

## 2022-10-26 ASSESSMENT — PAIN DESCRIPTION - PAIN TYPE: TYPE: ACUTE PAIN

## 2022-10-26 ASSESSMENT — SOCIAL DETERMINANTS OF HEALTH (SDOH): HOW HARD IS IT FOR YOU TO PAY FOR THE VERY BASICS LIKE FOOD, HOUSING, MEDICAL CARE, AND HEATING?: NOT HARD AT ALL

## 2022-10-26 ASSESSMENT — PAIN DESCRIPTION - DESCRIPTORS: DESCRIPTORS: ACHING

## 2022-10-26 NOTE — PROGRESS NOTES
Liliane Mclean (:  1945) is a Established patient, here for evaluation of the following: Body aches, pharyngitis, cough, fever, migraine  . Assessment & Plan   Below is the assessment and plan developed based on review of pertinent history, physical exam, labs, studies, and medications. 1. Influenza  -     AMB POC RAPID INFLUENZA TEST  -     oseltamivir (TAMIFLU) 75 MG capsule; Take 1 capsule by mouth 2 times daily for 5 days, Disp-10 capsule, R-0Normal  2. Nausea and vomiting, unspecified vomiting type  -     ondansetron (ZOFRAN) 8 MG tablet; Take 1 tablet by mouth every 8 hours as needed for Nausea or Vomiting, Disp-15 tablet, R-1Normal  3. Dehydration  -     ondansetron (ZOFRAN) 8 MG tablet; Take 1 tablet by mouth every 8 hours as needed for Nausea or Vomiting, Disp-15 tablet, R-1Normal  1. Influenza. Patient within the first 50 as needed for treatment. 2.  Nausea and vomiting with signs of dehydration. At the present time unable to take any fluids down. We will give Zofran and advised to take sports drinks as tolerated. Patient told that if she is unable to keep anything down even with the medications she would need 2. Will be urgent care or the emergency room for IV fluid hydration. Advised to look for dizziness when standing up. Patient feeling weak at this time. Return if symptoms worsen or fail to improve. Subjective   77-year-old female patient of Dr. Roberta Davis who comes in complaining of myalgia, rhinitis, cough, fever, migraine. Started Monday night. Vomiting since Monday. Unable to keep anything down. Patient feels weak. Patient had a negative COVID test.  Flu test was done which was positive. Patient did not get her flu vaccine for this season.     Review of Systems       Objective   Patient-Reported Vitals  Patient-Reported Systolic (Top): 315 mmHg  Patient-Reported Diastolic (Bottom): 84 mmHg  BP Observations: Yes, BP was taken on electronic monitoring device with digital readout  Patient-Reported Pulse: 114  Patient-Reported Temperature: 99       Physical Exam  [INSTRUCTIONS:  \"[x]\" Indicates a positive item  \"[]\" Indicates a negative item  -- DELETE ALL ITEMS NOT EXAMINED]    Constitutional: [x] Appears well-developed and well-nourished [x] No apparent distress      [x] Abnormal -appears tired    Mental status: [x] Alert and awake  [x] Oriented to person/place/time [x] Able to follow commands    [] Abnormal -     Eyes:   EOM    [x]  Normal    [] Abnormal -   Sclera  [x]  Normal    [] Abnormal -          Discharge []  None visible   [] Abnormal -     HENT: [x] Normocephalic, atraumatic  [] Abnormal -   [] Mouth/Throat: Mucous membranes are moist    External Ears [x] Normal  [] Abnormal -    Neck: [x] No visualized mass [] Abnormal -     Pulmonary/Chest: [x] Respiratory effort normal   [x] No visualized signs of difficulty breathing or respiratory distress        [] Abnormal -      Musculoskeletal:   [] Normal gait with no signs of ataxia         [x] Normal range of motion of neck        [] Abnormal -     Neurological:        [x] No Facial Asymmetry (Cranial nerve 7 motor function) (limited exam due to video visit)          [x] No gaze palsy        [] Abnormal -          Skin:        [x] No significant exanthematous lesions or discoloration noted on facial skin         [] Abnormal -            Psychiatric:       [x] Normal Affect [] Abnormal -        [x] No Hallucinations    Other pertinent observable physical exam findings:-             Erik Hunter, was evaluated through a synchronous (real-time) audio-video encounter. The patient (or guardian if applicable) is aware that this is a billable service, which includes applicable co-pays. This Virtual Visit was conducted with patient's (and/or legal guardian's) consent.  The visit was conducted pursuant to the emergency declaration under the [facility], [address] waiver authority and the Coronavirus Preparedness and Response Supplemental Appropriations Act. Patient identification was verified, and a caregiver was present when appropriate. The patient was located at Home: [place] [address]. Provider was located at [facility] (Appt Dept): [address] [facility],  [address] [address]         --Alex Carlton MD

## 2022-10-26 NOTE — TELEPHONE ENCOUNTER
[facility] [place] [id number] they do not have the TAMIFLU  in tablets, only in liquid form , however the RX needs changed before they can release it .

## 2022-10-27 VITALS
BODY MASS INDEX: 29.88 KG/M2 | DIASTOLIC BLOOD PRESSURE: 76 MMHG | OXYGEN SATURATION: 96 % | WEIGHT: 175 LBS | HEIGHT: 64 IN | RESPIRATION RATE: 18 BRPM | TEMPERATURE: 98.9 F | HEART RATE: 101 BPM | SYSTOLIC BLOOD PRESSURE: 124 MMHG

## 2022-10-27 PROCEDURE — 96361 HYDRATE IV INFUSION ADD-ON: CPT | Performed by: EMERGENCY MEDICINE

## 2022-10-27 PROCEDURE — 96375 TX/PRO/DX INJ NEW DRUG ADDON: CPT | Performed by: EMERGENCY MEDICINE

## 2022-10-27 PROCEDURE — 96374 THER/PROPH/DIAG INJ IV PUSH: CPT | Performed by: EMERGENCY MEDICINE

## 2022-10-27 PROCEDURE — 2580000003 HC RX 258: Performed by: EMERGENCY MEDICINE

## 2022-10-27 PROCEDURE — 6360000002 HC RX W HCPCS: Performed by: EMERGENCY MEDICINE

## 2022-10-27 RX ORDER — ONDANSETRON 4 MG/1
4 TABLET, ORALLY DISINTEGRATING ORAL 3 TIMES DAILY PRN
Qty: 21 TABLET | Refills: 0 | Status: SHIPPED | OUTPATIENT
Start: 2022-10-27

## 2022-10-27 RX ORDER — 0.9 % SODIUM CHLORIDE 0.9 %
1000 INTRAVENOUS SOLUTION INTRAVENOUS
Status: COMPLETED | OUTPATIENT
Start: 2022-10-27 | End: 2022-10-27

## 2022-10-27 RX ORDER — DEXAMETHASONE SODIUM PHOSPHATE 10 MG/ML
6 INJECTION, SOLUTION INTRAMUSCULAR; INTRAVENOUS ONCE
Status: COMPLETED | OUTPATIENT
Start: 2022-10-27 | End: 2022-10-27

## 2022-10-27 RX ORDER — ONDANSETRON 2 MG/ML
4 INJECTION INTRAMUSCULAR; INTRAVENOUS
Status: COMPLETED | OUTPATIENT
Start: 2022-10-27 | End: 2022-10-27

## 2022-10-27 RX ADMIN — ONDANSETRON 4 MG: 2 INJECTION INTRAMUSCULAR; INTRAVENOUS at 00:42

## 2022-10-27 RX ADMIN — DEXAMETHASONE SODIUM PHOSPHATE 6 MG: 10 INJECTION INTRAMUSCULAR; INTRAVENOUS at 01:59

## 2022-10-27 RX ADMIN — SODIUM CHLORIDE 1000 ML: 9 INJECTION, SOLUTION INTRAVENOUS at 00:41

## 2022-10-27 ASSESSMENT — ENCOUNTER SYMPTOMS
RECTAL PAIN: 0
ANAL BLEEDING: 0
DIARRHEA: 1
SHORTNESS OF BREATH: 0
ABDOMINAL DISTENTION: 0
BLOOD IN STOOL: 0
WHEEZING: 0
COUGH: 1
EYE REDNESS: 0
SINUS PAIN: 1
RHINORRHEA: 1
FACIAL SWELLING: 0
VOMITING: 1
ABDOMINAL PAIN: 0
BACK PAIN: 0
EYE PAIN: 0
STRIDOR: 0
CONSTIPATION: 0
NAUSEA: 1
EYE DISCHARGE: 0

## 2022-10-27 ASSESSMENT — PAIN SCALES - GENERAL: PAINLEVEL_OUTOF10: 6

## 2022-10-27 NOTE — ED TRIAGE NOTES
Pt to the ED from home with c/o of vomiting. Pt states that she tested positive for the flu A today at her PCP office.

## 2022-10-27 NOTE — ED PROVIDER NOTES
[facility] Emergency Department Provider Note                   PCP:                Andrea Bertrand MD               Age: 78 y.o. Sex: female       ICD-10-CM    1. Dehydration  E86.0       2. Nausea vomiting and diarrhea  R11.2     R19.7       3. Influenza A  J10.1           DISPOSITION Decision To Discharge 10/27/2022 02:28:09 AM       New Prescriptions    ONDANSETRON (ZOFRAN-ODT) 4 MG DISINTEGRATING TABLET    Take 1 tablet by mouth 3 times daily as needed for Nausea or Vomiting       Orders Placed This Encounter   Procedures    Insert peripheral IV         Fany Cortes is a 78 y.o. female who presents to the Emergency Department with chief complaint of    Chief Complaint   Patient presents with    Emesis      Mrs. Ubaldo Pelayo is a 72-year-old female with past medical history significant for hypertension, migraines, hypothyroidism, GERD, depression with anxiety, and atrial fibrillation, who presents with report that she has had 3 to 4 days of nausea, vomiting, diarrhea, cough, congestion, body aches, generalized weakness. She was seen by her PMD today and diagnosed with influenza A. She felt a little better after Zofran but has now gone back to vomiting every time she tries to drink anything. She reports she feels she is dehydrated. Review of Systems   Constitutional:  Negative for chills, diaphoresis, fatigue and fever. HENT:  Positive for congestion, postnasal drip, rhinorrhea and sinus pain. Negative for facial swelling and nosebleeds. Eyes:  Negative for pain, discharge and redness. Respiratory:  Positive for cough. Negative for shortness of breath, wheezing and stridor. Cardiovascular:  Negative for chest pain, palpitations and leg swelling. Gastrointestinal:  Positive for diarrhea, nausea and vomiting. Negative for abdominal distention, abdominal pain, anal bleeding, blood in stool, constipation and rectal pain.    Genitourinary:  Negative for dysuria, frequency, hematuria, Exam  Vitals and nursing note reviewed. Constitutional:       General: She is not in acute distress. Appearance: Normal appearance. She is ill-appearing. She is not toxic-appearing or diaphoretic. Comments: Lying on stretcher in semireclined position in no acute distress. Appears comfortable. Nontoxic-appearing. Acutely ill-appearing. HENT:      Head: Normocephalic and atraumatic. Right Ear: External ear normal.      Left Ear: External ear normal.      Nose: Nose normal. No congestion or rhinorrhea. Mouth/Throat:      Mouth: Mucous membranes are moist.      Pharynx: No oropharyngeal exudate or posterior oropharyngeal erythema. Eyes:      General: No scleral icterus. Right eye: No discharge. Left eye: No discharge. Extraocular Movements: Extraocular movements intact. Pupils: Pupils are equal, round, and reactive to light. Cardiovascular:      Rate and Rhythm: Regular rhythm. Tachycardia present. Pulses: Normal pulses. Heart sounds: Normal heart sounds. No murmur heard. No friction rub. No gallop. Pulmonary:      Effort: Pulmonary effort is normal. No respiratory distress. Breath sounds: Normal breath sounds. No stridor. No wheezing, rhonchi or rales. Abdominal:      General: Abdomen is flat. Bowel sounds are normal. There is no distension. Palpations: Abdomen is soft. There is no mass. Tenderness: There is no abdominal tenderness. There is no right CVA tenderness, left CVA tenderness or guarding. Musculoskeletal:         General: No swelling, tenderness, deformity or signs of injury. Normal range of motion. Cervical back: Normal range of motion and neck supple. No rigidity or tenderness. Right lower leg: No edema. Left lower leg: No edema. Lymphadenopathy:      Cervical: No cervical adenopathy. Skin:     General: Skin is warm. Capillary Refill: Capillary refill takes less than 2 seconds.       Coloration: Skin is not jaundiced or pale. Findings: No bruising or erythema. Neurological:      General: No focal deficit present. Mental Status: She is alert and oriented to person, place, and time. Motor: No weakness. Coordination: Coordination normal.      Gait: Gait normal.   Psychiatric:         Mood and Affect: Mood normal.         Behavior: Behavior normal.         Thought Content: Thought content normal.         Judgment: Judgment normal.        MDM  Number of Diagnoses or Management Options  Dehydration  Influenza A  Nausea vomiting and diarrhea  Diagnosis management comments: Nausea and vomiting secondary to influenza A. Improved with treatment. Feels much better after IV fluids, ondansetron, Decadron. Will discharge home with recommended follow-up with PMD, plenty of fluids, plenty of rest.  Tylenol and ibuprofen as needed for discomfort. Rx ondansetron ODT. Clear return precautions discussed. Amount and/or Complexity of Data Reviewed  Tests in the medicine section of CPT®: ordered and reviewed  Decide to obtain previous medical records or to obtain history from someone other than the patient: yes  Obtain history from someone other than the patient: yes  Review and summarize past medical records: yes        Procedures    Labs Reviewed - No data to display     No orders to display            Pham Coma Scale  Eye Opening: Spontaneous  Best Verbal Response: Oriented  Best Motor Response: Obeys commands  [place] Coma Scale Score: 15                     Voice dictation software was used during the making of this note. This software is not perfect and grammatical and other typographical errors may be present. This note has not been completely proofread for errors.        Tripp Rojas MD  10/27/22 [id number]

## 2022-12-08 ENCOUNTER — TELEPHONE (OUTPATIENT)
Dept: INTERNAL MEDICINE CLINIC | Facility: CLINIC | Age: 77
End: 2022-12-08

## 2022-12-09 ENCOUNTER — TELEMEDICINE (OUTPATIENT)
Dept: INTERNAL MEDICINE CLINIC | Facility: CLINIC | Age: 77
End: 2022-12-09
Payer: MEDICARE

## 2022-12-09 DIAGNOSIS — E78.00 PURE HYPERCHOLESTEROLEMIA: ICD-10-CM

## 2022-12-09 DIAGNOSIS — J06.9 UPPER RESPIRATORY TRACT INFECTION, UNSPECIFIED TYPE: Primary | ICD-10-CM

## 2022-12-09 DIAGNOSIS — I10 ESSENTIAL HYPERTENSION: ICD-10-CM

## 2022-12-09 DIAGNOSIS — K21.9 GASTROESOPHAGEAL REFLUX DISEASE, UNSPECIFIED WHETHER ESOPHAGITIS PRESENT: ICD-10-CM

## 2022-12-09 DIAGNOSIS — M25.512 PAIN IN JOINT OF LEFT SHOULDER: ICD-10-CM

## 2022-12-09 PROCEDURE — G8427 DOCREV CUR MEDS BY ELIG CLIN: HCPCS | Performed by: FAMILY MEDICINE

## 2022-12-09 PROCEDURE — 3017F COLORECTAL CA SCREEN DOC REV: CPT | Performed by: FAMILY MEDICINE

## 2022-12-09 PROCEDURE — 99214 OFFICE O/P EST MOD 30 MIN: CPT | Performed by: FAMILY MEDICINE

## 2022-12-09 PROCEDURE — 1090F PRES/ABSN URINE INCON ASSESS: CPT | Performed by: FAMILY MEDICINE

## 2022-12-09 PROCEDURE — G8400 PT W/DXA NO RESULTS DOC: HCPCS | Performed by: FAMILY MEDICINE

## 2022-12-09 PROCEDURE — 1123F ACP DISCUSS/DSCN MKR DOCD: CPT | Performed by: FAMILY MEDICINE

## 2022-12-09 RX ORDER — PANTOPRAZOLE SODIUM 40 MG/1
40 TABLET, DELAYED RELEASE ORAL DAILY
Qty: 90 TABLET | Refills: 3 | Status: SHIPPED | OUTPATIENT
Start: 2022-12-09

## 2022-12-09 RX ORDER — ATORVASTATIN CALCIUM 20 MG/1
20 TABLET, FILM COATED ORAL DAILY
Qty: 90 TABLET | Refills: 2 | Status: SHIPPED | OUTPATIENT
Start: 2022-12-09 | End: 2023-12-09

## 2022-12-09 RX ORDER — MELOXICAM 15 MG/1
15 TABLET ORAL DAILY
Qty: 90 TABLET | Refills: 3 | Status: SHIPPED | OUTPATIENT
Start: 2022-12-09 | End: 2022-12-09

## 2022-12-09 RX ORDER — METOPROLOL SUCCINATE 100 MG/1
100 TABLET, EXTENDED RELEASE ORAL DAILY
Qty: 90 TABLET | Refills: 3 | Status: SHIPPED | OUTPATIENT
Start: 2022-12-09

## 2022-12-09 RX ORDER — AZITHROMYCIN 250 MG/1
250 TABLET, FILM COATED ORAL SEE ADMIN INSTRUCTIONS
Qty: 6 TABLET | Refills: 0 | Status: SHIPPED | OUTPATIENT
Start: 2022-12-09 | End: 2022-12-14

## 2022-12-09 RX ORDER — HYDROCHLOROTHIAZIDE 25 MG/1
25 TABLET ORAL DAILY
Qty: 90 TABLET | Refills: 3 | Status: SHIPPED | OUTPATIENT
Start: 2022-12-09

## 2022-12-09 RX ORDER — MELOXICAM 15 MG/1
15 TABLET ORAL DAILY
Qty: 90 TABLET | Refills: 0 | Status: SHIPPED | OUTPATIENT
Start: 2022-12-09

## 2022-12-09 RX ORDER — PREDNISONE 20 MG/1
20 TABLET ORAL 2 TIMES DAILY
Qty: 10 TABLET | Refills: 0 | Status: SHIPPED | OUTPATIENT
Start: 2022-12-09 | End: 2022-12-14

## 2022-12-09 NOTE — PROGRESS NOTES
Krystal Gonzalez (:  1945) is a Established patient, here for evaluation of the following:    Assessment & Plan   Below is the assessment and plan developed based on review of pertinent history, physical exam, labs, studies, and medications. 1. Upper respiratory tract infection, unspecified type  -     azithromycin (ZITHROMAX) 250 MG tablet; Take 1 tablet by mouth See Admin Instructions for 5 days 500mg on day 1 followed by 250mg on days 2 - 5, Disp-6 tablet, R-0Normal  -     predniSONE (DELTASONE) 20 MG tablet; Take 1 tablet by mouth 2 times daily for 5 days, Disp-10 tablet, R-0Normal  2. Gastroesophageal reflux disease, unspecified whether esophagitis present  -     pantoprazole (PROTONIX) 40 MG tablet; Take 1 tablet by mouth daily, Disp-90 tablet, R-3Normal  3. Pain in joint of left shoulder  -     meloxicam (MOBIC) 15 MG tablet; Take 1 tablet by mouth daily 1 tab daily, Disp-90 tablet, R-0Normal  4. Essential hypertension  -     metoprolol succinate (TOPROL XL) 100 MG extended release tablet; Take 1 tablet by mouth daily, Disp-90 tablet, R-3Normal  -     hydroCHLOROthiazide (HYDRODIURIL) 25 MG tablet; Take 1 tablet by mouth daily, Disp-90 tablet, R-3Normal  -     CBC with Auto Differential; Future  -     Comprehensive Metabolic Panel; Future  -     Urinalysis with Reflex to Culture; Future  5. Pure hypercholesterolemia  -     atorvastatin (LIPITOR) 20 MG tablet; Take 1 tablet by mouth daily, Disp-90 tablet, R-2Normal  -     Comprehensive Metabolic Panel; Future  -     Lipid Panel; Future  1.  URI. Will place on Z-Aly and prednisone. Follow-up if not improving. 2.  GERD continue pantoprazole. 3.  Left shoulder pain. Continue Mobic. 4.  Hypertension continue metoprolol and hydrochlorothiazide. 5.  Hyperlipidemia continue atorvastatin. Return in about 3 months (around 3/9/2023) for ffup with pcp, ffup with labs prior to visit.        Subjective   44-year-old female patient of Dr. Orie Kehr comes in for follow-up of  1. Patient apparently had fluid in October was just starting to get over symptoms when she started to have sinus congestion yesterday associated with cough. No fever or chills. + sinus congestion. no body aches. Apparently has been going on a while. Took benadryl with no relief. 2.  Patient would like to discuss medication refills because he will be going to [place]. 3.  Medications for reflux, hypertension, GERD refill for patient.     Review of Systems       Objective   Patient-Reported Vitals  No data recorded     Physical Exam  [INSTRUCTIONS:  \"[x]\" Indicates a positive item  \"[]\" Indicates a negative item  -- DELETE ALL ITEMS NOT EXAMINED]    Constitutional: [x] Appears well-developed and well-nourished [x] No apparent distress      [] Abnormal -     Mental status: [x] Alert and awake  [x] Oriented to person/place/time [x] Able to follow commands    [] Abnormal -     Eyes:   EOM    [x]  Normal    [] Abnormal -   Sclera  [x]  Normal    [] Abnormal -          Discharge []  None visible   [] Abnormal -     HENT: [x] Normocephalic, atraumatic  [] Abnormal -   [] Mouth/Throat: Mucous membranes are moist    External Ears [x] Normal  [] Abnormal -    Neck: [x] No visualized mass [] Abnormal -     Pulmonary/Chest: [x] Respiratory effort normal   [x] No visualized signs of difficulty breathing or respiratory distress        [] Abnormal -      Musculoskeletal:   [x] Normal gait with no signs of ataxia         [x] Normal range of motion of neck        [] Abnormal -     Neurological:        [x] No Facial Asymmetry (Cranial nerve 7 motor function) (limited exam due to video visit)          [x] No gaze palsy        [] Abnormal -          Skin:        [x] No significant exanthematous lesions or discoloration noted on facial skin         [] Abnormal -            Psychiatric:       [x] Normal Affect [] Abnormal -        [x] No Hallucinations    Other pertinent observable physical exam findings:-             Donavon Escobar, was evaluated through a synchronous (real-time) audio-video encounter. The patient (or guardian if applicable) is aware that this is a billable service, which includes applicable co-pays. This Virtual Visit was conducted with patient's (and/or legal guardian's) consent. The visit was conducted pursuant to the emergency declaration under the [address] [place] [address], [address] [place] [address] and the [employer] and [employer] Act. Patient identification was verified, and a caregiver was present when appropriate. The patient was located at . Provider was located at Home: [address] (Appt Dept): [address] [facility],  [address] [address].         --Laure Durham MD

## 2022-12-10 ENCOUNTER — PATIENT MESSAGE (OUTPATIENT)
Dept: INTERNAL MEDICINE CLINIC | Facility: CLINIC | Age: 77
End: 2022-12-10

## 2022-12-10 DIAGNOSIS — F41.8 DEPRESSION WITH ANXIETY: ICD-10-CM

## 2022-12-12 RX ORDER — ALPRAZOLAM 1 MG/1
1 TABLET ORAL NIGHTLY PRN
Qty: 30 TABLET | Refills: 3 | Status: SHIPPED | OUTPATIENT
Start: 2022-12-12 | End: 2023-01-11

## 2022-12-12 NOTE — TELEPHONE ENCOUNTER
From: Tj Montiel  To: Dr. Gopi Kohli: 12/10/2022 4:34 PM EST  Subject: Xanax 1mg    Please refill my prescription I take for sleep

## 2022-12-20 DIAGNOSIS — K21.9 GASTROESOPHAGEAL REFLUX DISEASE, UNSPECIFIED WHETHER ESOPHAGITIS PRESENT: ICD-10-CM

## 2022-12-20 RX ORDER — PANTOPRAZOLE SODIUM 40 MG/1
40 TABLET, DELAYED RELEASE ORAL DAILY
Qty: 90 TABLET | Refills: 3 | Status: CANCELLED | OUTPATIENT
Start: 2022-12-20

## 2022-12-20 NOTE — TELEPHONE ENCOUNTER
According to our records, patient should have enough refills left at the [facility]. [email or web address] message sent to patient.

## 2023-02-01 ENCOUNTER — NURSE ONLY (OUTPATIENT)
Dept: INTERNAL MEDICINE CLINIC | Facility: CLINIC | Age: 78
End: 2023-02-01

## 2023-02-01 DIAGNOSIS — E78.00 PURE HYPERCHOLESTEROLEMIA: ICD-10-CM

## 2023-02-01 DIAGNOSIS — I10 ESSENTIAL HYPERTENSION: ICD-10-CM

## 2023-02-01 DIAGNOSIS — E03.9 PRIMARY HYPOTHYROIDISM: ICD-10-CM

## 2023-02-01 LAB
APPEARANCE UR: ABNORMAL
BACTERIA URNS QL MICRO: ABNORMAL /HPF
BASOPHILS # BLD: 0 K/UL (ref 0–0.2)
BASOPHILS NFR BLD: 1 % (ref 0–2)
BILIRUB UR QL: ABNORMAL
CASTS URNS QL MICRO: 0 /LPF
COLOR UR: ABNORMAL
CRYSTALS URNS QL MICRO: 0 /LPF
DIFFERENTIAL METHOD BLD: NORMAL
EOSINOPHIL # BLD: 0.3 K/UL (ref 0–0.8)
EOSINOPHIL NFR BLD: 4 % (ref 0.5–7.8)
EPI CELLS #/AREA URNS HPF: ABNORMAL /HPF
ERYTHROCYTE [DISTWIDTH] IN BLOOD BY AUTOMATED COUNT: 13.2 % (ref 11.9–14.6)
GLUCOSE UR STRIP.AUTO-MCNC: NEGATIVE MG/DL
HCT VFR BLD AUTO: 43.7 % (ref 35.8–46.3)
HGB BLD-MCNC: 13.9 G/DL (ref 11.7–15.4)
HGB UR QL STRIP: NEGATIVE
IMM GRANULOCYTES # BLD AUTO: 0 K/UL (ref 0–0.5)
IMM GRANULOCYTES NFR BLD AUTO: 0 % (ref 0–5)
KETONES UR QL STRIP.AUTO: ABNORMAL MG/DL
LEUKOCYTE ESTERASE UR QL STRIP.AUTO: ABNORMAL
LYMPHOCYTES # BLD: 1.2 K/UL (ref 0.5–4.6)
LYMPHOCYTES NFR BLD: 18 % (ref 13–44)
MCH RBC QN AUTO: 30.5 PG (ref 26.1–32.9)
MCHC RBC AUTO-ENTMCNC: 31.8 G/DL (ref 31.4–35)
MCV RBC AUTO: 95.8 FL (ref 82–102)
MONOCYTES # BLD: 0.5 K/UL (ref 0.1–1.3)
MONOCYTES NFR BLD: 8 % (ref 4–12)
MUCOUS THREADS URNS QL MICRO: 0 /LPF
NEUTS SEG # BLD: 4.7 K/UL (ref 1.7–8.2)
NEUTS SEG NFR BLD: 69 % (ref 43–78)
NITRITE UR QL STRIP.AUTO: NEGATIVE
NRBC # BLD: 0 K/UL (ref 0–0.2)
OTHER OBSERVATIONS: ABNORMAL
PH UR STRIP: 5 (ref 5–9)
PLATELET # BLD AUTO: 270 K/UL (ref 150–450)
PMV BLD AUTO: 10.3 FL (ref 9.4–12.3)
PROT UR STRIP-MCNC: ABNORMAL MG/DL
RBC # BLD AUTO: 4.56 M/UL (ref 4.05–5.2)
RBC #/AREA URNS HPF: 0 /HPF
SP GR UR REFRACTOMETRY: 1.03 (ref 1–1.02)
URINE CULTURE IF INDICATED: ABNORMAL
UROBILINOGEN UR QL STRIP.AUTO: 0.2 EU/DL (ref 0.2–1)
WBC # BLD AUTO: 6.8 K/UL (ref 4.3–11.1)
WBC URNS QL MICRO: ABNORMAL /HPF

## 2023-02-02 LAB
ALBUMIN SERPL-MCNC: 3.9 G/DL (ref 3.2–4.6)
ALBUMIN/GLOB SERPL: 1.2 (ref 0.4–1.6)
ALP SERPL-CCNC: 114 U/L (ref 50–136)
ALT SERPL-CCNC: 25 U/L (ref 12–65)
ANION GAP SERPL CALC-SCNC: 7 MMOL/L (ref 2–11)
AST SERPL-CCNC: 14 U/L (ref 15–37)
BILIRUB SERPL-MCNC: 0.8 MG/DL (ref 0.2–1.1)
BUN SERPL-MCNC: 26 MG/DL (ref 8–23)
CALCIUM SERPL-MCNC: 9.9 MG/DL (ref 8.3–10.4)
CHLORIDE SERPL-SCNC: 104 MMOL/L (ref 101–110)
CHOLEST SERPL-MCNC: 185 MG/DL
CO2 SERPL-SCNC: 31 MMOL/L (ref 21–32)
CREAT SERPL-MCNC: 1.2 MG/DL (ref 0.6–1)
GLOBULIN SER CALC-MCNC: 3.3 G/DL (ref 2.8–4.5)
GLUCOSE SERPL-MCNC: 110 MG/DL (ref 65–100)
HDLC SERPL-MCNC: 44 MG/DL (ref 40–60)
HDLC SERPL: 4.2
LDLC SERPL CALC-MCNC: 89.4 MG/DL
POTASSIUM SERPL-SCNC: 4.2 MMOL/L (ref 3.5–5.1)
PROT SERPL-MCNC: 7.2 G/DL (ref 6.3–8.2)
SODIUM SERPL-SCNC: 142 MMOL/L (ref 133–143)
TRIGL SERPL-MCNC: 258 MG/DL (ref 35–150)
VLDLC SERPL CALC-MCNC: 51.6 MG/DL (ref 6–23)

## 2023-02-02 NOTE — PROGRESS NOTES
2/7/2023     Subjective:     Chief Complaint   Patient presents with    6 Month Follow-Up    Discuss Labs       HPI:     Still having hot flashes, is off HRT now. Hyperlipidemia  The patient is following a low fat diet and is not exercising regularly. She is tolerating current treatment well and  is compliant. Patient is not having associated symptoms of shortness of breath, chest pain, rapid heart rate, irregular heart rate, and dizziness    Patient labs are YOUR LAST LIPID PROFILE:   Lab Results   Component Value Date    CHOL 185 02/01/2023    CHOL 166 06/16/2022    CHOL 183 12/03/2021     Lab Results   Component Value Date    TRIG 258 (H) 02/01/2023    TRIG 222 (H) 06/16/2022    TRIG 138 12/03/2021     Lab Results   Component Value Date    HDL 44 02/01/2023    HDL 43 06/16/2022    HDL 46 12/03/2021     Lab Results   Component Value Date    LDLCALC 89.4 02/01/2023    LDLCALC 78.6 06/16/2022    LDLCALC 112 (H) 12/03/2021     Lab Results   Component Value Date    LABVLDL 51.6 (H) 02/01/2023    LABVLDL 44.4 (H) 06/16/2022    VLDL 25 12/03/2021    VLDL 25 07/08/2021     Lab Results   Component Value Date    CHOLHDLRATIO 4.2 02/01/2023    CHOLHDLRATIO 3.9 06/16/2022        Hypothyroidism  The patient is a 78 y.o. female who is seen for follow up of hypothyroidism. Current symptoms: none. Patient denies change in energy level, diarrhea, heat / cold intolerance, and nervousness. Symptoms are stable. The patient is compliant. She is being followed by Endocrinology  Most recent lab:   Lab Results   Component Value Date    TSH 2.130 12/03/2021       Hypertension   She is not exercising and is adherent to low salt diet. Cardiac symptoms: none. Cardiovascular risk factors: dyslipidemia and hypertension. Blood pressure is not measured at home. Anxiety  About the same. Having to care for . GERD  Now better, trying to change her diet.  Seeing GI - recommended EGD but she is trying to work on lifestyle changes. Left shoulder pain  Mostly hurts where her bra sits. Feels swollen and extends neck. Full ROM with arms; thinks it may be due to her breasts 40DDD     Interim History:   Flu in October, then had a sinus infection  Having cataract surgery this year. Review of Systems   Constitutional:  Negative for fatigue. HENT:  Negative for congestion. Respiratory:  Negative for shortness of breath. Cardiovascular:  Negative for chest pain. Gastrointestinal:  Negative for abdominal pain and blood in stool. Genitourinary:  Negative for difficulty urinating. Musculoskeletal:  Positive for arthralgias and joint swelling. Skin:  Negative for rash. Neurological:  Negative for headaches. Psychiatric/Behavioral:  The patient is nervous/anxious.         Past Medical History:   Diagnosis Date    Atrial fibrillation ([facility] [address]     Depression with anxiety     Diverticulosis of colon     Essential hypertension     GERD (gastroesophageal reflux disease)     Hashimoto's thyroiditis     Hypercholesterolemia     Migraine     Mitral valve prolapse     Multiple thyroid nodules     Peptic ulcer of stomach     Postmenopausal     Primary hypothyroidism     Spider telangiectasis      Past Surgical History:   Procedure Laterality Date    APPENDECTOMY  age 27    BREAST BIOPSY      BREAST RECONSTRUCTION Bilateral 1990    CHOLECYSTECTOMY  2008    COLONOSCOPY      COLONOSCOPY N/A 2/15/2021    COLONOSCOPY/ BMI 30 performed by Xochitl Sage MD at [address] [place] [address]  06/2014    sinus surgery    HERNIA REPAIR  age 41    HYSTERECTOMY (CERVIX STATUS UNKNOWN)  age 30    UPPER GASTROINTESTINAL ENDOSCOPY  3/2016     Family History   Problem Relation Age of Onset    Lung Cancer Father     Thyroid Cancer Maternal Cousin     Breast Cancer Maternal Aunt     Breast Cancer Maternal Cousin     Heart Disease Mother     Broken Bones Mother      Social History     Socioeconomic History    Marital status:      Spouse name: None    Number of children: None    Years of education: None    Highest education level: None   Tobacco Use    Smoking status: Never    Smokeless tobacco: Never   Vaping Use    Vaping Use: Never used   Substance and Sexual Activity    Alcohol use: No    Drug use: No     Social Determinants of Health     Financial Resource Strain: Low Risk     Difficulty of Paying Living Expenses: Not very hard   Food Insecurity: No Food Insecurity    Worried About Running Out of Food in the Last Year: Never true    Ran Out of Food in the Last Year: Never true   Transportation Needs: Unknown    Lack of Transportation (Non-Medical): No   Housing Stability: Unknown    Unstable Housing in the Last Year: No      Current Outpatient Medications   Medication Sig Dispense Refill    diclofenac sodium (VOLTAREN) 1 % GEL Apply 4 g topically 4 times daily 350 g 0    pantoprazole (PROTONIX) 40 MG tablet Take 1 tablet by mouth daily 90 tablet 3    metoprolol succinate (TOPROL XL) 100 MG extended release tablet Take 1 tablet by mouth daily 90 tablet 3    hydroCHLOROthiazide (HYDRODIURIL) 25 MG tablet Take 1 tablet by mouth daily 90 tablet 3    atorvastatin (LIPITOR) 20 MG tablet Take 1 tablet by mouth daily 90 tablet 2    meloxicam (MOBIC) 15 MG tablet Take 1 tablet by mouth daily 1 tab daily 90 tablet 0    levothyroxine (SYNTHROID) 50 MCG tablet Take 1 tablet by mouth Six times weekly 77 tablet 3    citalopram (CELEXA) 20 MG tablet Take 1 tablet by mouth daily 90 tablet 3    dicyclomine (BENTYL) 20 MG tablet Take 1 tablet by mouth every 6 hours as needed (abdominal cramps) 120 tablet 3    SUMAtriptan (IMITREX) 100 MG tablet Take 1 tablet by mouth as needed for Migraine 10 tablet 6    ondansetron (ZOFRAN-ODT) 4 MG disintegrating tablet Take 1 tablet by mouth 3 times daily as needed for Nausea or Vomiting 21 tablet 0    ondansetron (ZOFRAN) 8 MG tablet Take 1 tablet by mouth every 8 hours as needed for Nausea or Vomiting 15 tablet 1 mupirocin (BACTROBAN) 2 % ointment Apply topically daily 22 g 0    ketoconazole (NIZORAL) 2 % cream Apply  to affected area daily. - Topical 60 g 0    sucralfate (CARAFATE) 1 GM tablet Take 1 tablet by mouth 4 times daily 120 tablet 5    aspirin 81 MG EC tablet Take 81 mg by mouth daily      Cholecalciferol 50 MCG (2000 UT) TABS Take by mouth       No current facility-administered medications for this visit. Allergies   Allergen Reactions    Gluten Meal Nausea Only    Milk Protein Nausea Only       Objective:   /64   Pulse 83   Resp 14   Ht 5' 4\" (1.626 m)   Wt 184 lb (83.5 kg)   SpO2 99%   BMI 31.58 kg/m²     Physical Exam  Vitals and nursing note reviewed. Constitutional:       General: She is not in acute distress. Appearance: Normal appearance. She is normal weight. She is not ill-appearing, toxic-appearing or diaphoretic. HENT:      Head: Normocephalic. Nose: Nose normal.   Eyes:      Extraocular Movements: Extraocular movements intact. Cardiovascular:      Rate and Rhythm: Normal rate and regular rhythm. Heart sounds: No murmur heard. Pulmonary:      Effort: Pulmonary effort is normal.      Breath sounds: Normal breath sounds. Musculoskeletal:         General: No deformity. Skin:     General: Skin is warm. Neurological:      General: No focal deficit present. Mental Status: She is alert. Psychiatric:         Mood and Affect: Mood normal.       Assessment and Plan:      Diagnosis Orders   1. Chronic pain of both shoulders  diclofenac sodium (VOLTAREN) 1 % GEL    XR SHOULDER BILATERAL STANDARD    [address] [place] [address]      2. Gastroesophageal reflux disease, unspecified whether esophagitis present  pantoprazole (PROTONIX) 40 MG tablet    dicyclomine (BENTYL) 20 MG tablet      3.  Essential hypertension  metoprolol succinate (TOPROL XL) 100 MG extended release tablet    hydroCHLOROthiazide (HYDRODIURIL) 25 MG tablet 4. Pure hypercholesterolemia  atorvastatin (LIPITOR) 20 MG tablet    Comprehensive Metabolic Panel    CBC    Lipid Panel      5. Pain in joint of left shoulder  meloxicam (MOBIC) 15 MG tablet      6. Primary hypothyroidism  levothyroxine (SYNTHROID) 50 MCG tablet    TSH      7. Depression with anxiety  citalopram (CELEXA) 20 MG tablet      8. Chronic migraine without aura, not intractable, without status migrainosus  SUMAtriptan (IMITREX) 100 MG tablet    ondansetron (ZOFRAN-ODT) 4 MG disintegrating tablet      9. Impaired fasting blood sugar  Hemoglobin A1C      10. Vitamin D deficiency  Vitamin D 25 Hydroxy          Data reviewed:  Previous notes, labs and Specialists notes, if any, reviewed and discussed with patient. Bilateral shoulder pain left worse than right. X-rays of both shoulders. Diclofenac gel to affected area. Referral to orthopedic for further evaluation I suspect she has a right bursitis. GERD is controlled. Blood pressure today is good. Hyperlipidemia continue medication. Reinforced lifestyle changes. Hypothyroidism continue current medication dose. Depression and anxiety. Fair control she is under stress. Advised to do more walks outdoors which will also help her cardiovascular status. Migraine is stable. IFG. Reinforced lifestyle changes. Continue vitamin D supplement. Opportunity to ask questions was offered and were answered to the best of my ability. Follow-up will be in 6 months for her AWV with labs prior to her visit. Over 50% of today's office visit was spent in face to face time reviewing test results, prognosis, importance of compliance, education about disease process, benefits of medications, instructions for management of disease and follow up plans. Total face to face time spent with patient was at least 25 minutes      There are no Patient Instructions on file for this visit. No follow-up provider specified.     Stan Ashley MD

## 2023-02-06 DIAGNOSIS — E03.9 PRIMARY HYPOTHYROIDISM: Primary | ICD-10-CM

## 2023-02-07 ENCOUNTER — OFFICE VISIT (OUTPATIENT)
Dept: INTERNAL MEDICINE CLINIC | Facility: CLINIC | Age: 78
End: 2023-02-07
Payer: MEDICARE

## 2023-02-07 VITALS
OXYGEN SATURATION: 99 % | HEIGHT: 64 IN | SYSTOLIC BLOOD PRESSURE: 110 MMHG | HEART RATE: 83 BPM | BODY MASS INDEX: 31.41 KG/M2 | RESPIRATION RATE: 14 BRPM | DIASTOLIC BLOOD PRESSURE: 64 MMHG | WEIGHT: 184 LBS

## 2023-02-07 DIAGNOSIS — E55.9 VITAMIN D DEFICIENCY: ICD-10-CM

## 2023-02-07 DIAGNOSIS — E03.9 PRIMARY HYPOTHYROIDISM: ICD-10-CM

## 2023-02-07 DIAGNOSIS — I10 ESSENTIAL HYPERTENSION: ICD-10-CM

## 2023-02-07 DIAGNOSIS — G89.29 CHRONIC PAIN OF BOTH SHOULDERS: Primary | ICD-10-CM

## 2023-02-07 DIAGNOSIS — R73.01 IMPAIRED FASTING BLOOD SUGAR: ICD-10-CM

## 2023-02-07 DIAGNOSIS — M25.511 CHRONIC PAIN OF BOTH SHOULDERS: Primary | ICD-10-CM

## 2023-02-07 DIAGNOSIS — K21.9 GASTROESOPHAGEAL REFLUX DISEASE, UNSPECIFIED WHETHER ESOPHAGITIS PRESENT: ICD-10-CM

## 2023-02-07 DIAGNOSIS — M25.512 CHRONIC PAIN OF BOTH SHOULDERS: Primary | ICD-10-CM

## 2023-02-07 DIAGNOSIS — E78.00 PURE HYPERCHOLESTEROLEMIA: ICD-10-CM

## 2023-02-07 DIAGNOSIS — F41.8 DEPRESSION WITH ANXIETY: ICD-10-CM

## 2023-02-07 DIAGNOSIS — M25.512 PAIN IN JOINT OF LEFT SHOULDER: ICD-10-CM

## 2023-02-07 DIAGNOSIS — G43.709 CHRONIC MIGRAINE WITHOUT AURA, NOT INTRACTABLE, WITHOUT STATUS MIGRAINOSUS: ICD-10-CM

## 2023-02-07 PROCEDURE — 99214 OFFICE O/P EST MOD 30 MIN: CPT | Performed by: FAMILY MEDICINE

## 2023-02-07 PROCEDURE — G8427 DOCREV CUR MEDS BY ELIG CLIN: HCPCS | Performed by: FAMILY MEDICINE

## 2023-02-07 PROCEDURE — G8400 PT W/DXA NO RESULTS DOC: HCPCS | Performed by: FAMILY MEDICINE

## 2023-02-07 PROCEDURE — 1123F ACP DISCUSS/DSCN MKR DOCD: CPT | Performed by: FAMILY MEDICINE

## 2023-02-07 PROCEDURE — 1090F PRES/ABSN URINE INCON ASSESS: CPT | Performed by: FAMILY MEDICINE

## 2023-02-07 PROCEDURE — G8484 FLU IMMUNIZE NO ADMIN: HCPCS | Performed by: FAMILY MEDICINE

## 2023-02-07 PROCEDURE — 3078F DIAST BP <80 MM HG: CPT | Performed by: FAMILY MEDICINE

## 2023-02-07 PROCEDURE — 3017F COLORECTAL CA SCREEN DOC REV: CPT | Performed by: FAMILY MEDICINE

## 2023-02-07 PROCEDURE — 3074F SYST BP LT 130 MM HG: CPT | Performed by: FAMILY MEDICINE

## 2023-02-07 PROCEDURE — G8417 CALC BMI ABV UP PARAM F/U: HCPCS | Performed by: FAMILY MEDICINE

## 2023-02-07 PROCEDURE — 1036F TOBACCO NON-USER: CPT | Performed by: FAMILY MEDICINE

## 2023-02-07 RX ORDER — PANTOPRAZOLE SODIUM 40 MG/1
40 TABLET, DELAYED RELEASE ORAL DAILY
Qty: 90 TABLET | Refills: 3 | Status: SHIPPED | OUTPATIENT
Start: 2023-02-07

## 2023-02-07 RX ORDER — LEVOTHYROXINE SODIUM 0.05 MG/1
50 TABLET ORAL
Qty: 77 TABLET | Refills: 3 | Status: SHIPPED | OUTPATIENT
Start: 2023-02-07

## 2023-02-07 RX ORDER — HYDROCHLOROTHIAZIDE 25 MG/1
25 TABLET ORAL DAILY
Qty: 90 TABLET | Refills: 3 | Status: SHIPPED | OUTPATIENT
Start: 2023-02-07

## 2023-02-07 RX ORDER — SUMATRIPTAN 100 MG/1
100 TABLET, FILM COATED ORAL PRN
Qty: 10 TABLET | Refills: 6 | Status: SHIPPED | OUTPATIENT
Start: 2023-02-07

## 2023-02-07 RX ORDER — METOPROLOL SUCCINATE 100 MG/1
100 TABLET, EXTENDED RELEASE ORAL DAILY
Qty: 90 TABLET | Refills: 3 | Status: SHIPPED | OUTPATIENT
Start: 2023-02-07

## 2023-02-07 RX ORDER — ATORVASTATIN CALCIUM 20 MG/1
20 TABLET, FILM COATED ORAL DAILY
Qty: 90 TABLET | Refills: 2 | Status: SHIPPED | OUTPATIENT
Start: 2023-02-07 | End: 2024-02-07

## 2023-02-07 RX ORDER — ONDANSETRON 4 MG/1
4 TABLET, ORALLY DISINTEGRATING ORAL 3 TIMES DAILY PRN
Qty: 21 TABLET | Refills: 0 | Status: SHIPPED | OUTPATIENT
Start: 2023-02-07

## 2023-02-07 RX ORDER — MELOXICAM 15 MG/1
15 TABLET ORAL DAILY
Qty: 90 TABLET | Refills: 0 | Status: SHIPPED | OUTPATIENT
Start: 2023-02-07

## 2023-02-07 RX ORDER — DICYCLOMINE HCL 20 MG
20 TABLET ORAL EVERY 6 HOURS PRN
Qty: 120 TABLET | Refills: 3 | Status: SHIPPED | OUTPATIENT
Start: 2023-02-07

## 2023-02-07 RX ORDER — CITALOPRAM 20 MG/1
20 TABLET ORAL DAILY
Qty: 90 TABLET | Refills: 3 | Status: SHIPPED | OUTPATIENT
Start: 2023-02-07

## 2023-02-07 SDOH — ECONOMIC STABILITY: INCOME INSECURITY: HOW HARD IS IT FOR YOU TO PAY FOR THE VERY BASICS LIKE FOOD, HOUSING, MEDICAL CARE, AND HEATING?: NOT VERY HARD

## 2023-02-07 SDOH — ECONOMIC STABILITY: HOUSING INSECURITY
IN THE LAST 12 MONTHS, WAS THERE A TIME WHEN YOU DID NOT HAVE A STEADY PLACE TO SLEEP OR SLEPT IN A SHELTER (INCLUDING NOW)?: NO

## 2023-02-07 SDOH — ECONOMIC STABILITY: FOOD INSECURITY: WITHIN THE PAST 12 MONTHS, YOU WORRIED THAT YOUR FOOD WOULD RUN OUT BEFORE YOU GOT MONEY TO BUY MORE.: NEVER TRUE

## 2023-02-07 SDOH — ECONOMIC STABILITY: FOOD INSECURITY: WITHIN THE PAST 12 MONTHS, THE FOOD YOU BOUGHT JUST DIDN'T LAST AND YOU DIDN'T HAVE MONEY TO GET MORE.: NEVER TRUE

## 2023-02-07 ASSESSMENT — PATIENT HEALTH QUESTIONNAIRE - PHQ9
10. IF YOU CHECKED OFF ANY PROBLEMS, HOW DIFFICULT HAVE THESE PROBLEMS MADE IT FOR YOU TO DO YOUR WORK, TAKE CARE OF THINGS AT HOME, OR GET ALONG WITH OTHER PEOPLE: 0
2. FEELING DOWN, DEPRESSED OR HOPELESS: 0
4. FEELING TIRED OR HAVING LITTLE ENERGY: 0
6. FEELING BAD ABOUT YOURSELF - OR THAT YOU ARE A FAILURE OR HAVE LET YOURSELF OR YOUR FAMILY DOWN: 0
3. TROUBLE FALLING OR STAYING ASLEEP: 0
7. TROUBLE CONCENTRATING ON THINGS, SUCH AS READING THE NEWSPAPER OR WATCHING TELEVISION: 0
SUM OF ALL RESPONSES TO PHQ QUESTIONS 1-9: 0
1. LITTLE INTEREST OR PLEASURE IN DOING THINGS: 0
SUM OF ALL RESPONSES TO PHQ QUESTIONS 1-9: 0
SUM OF ALL RESPONSES TO PHQ9 QUESTIONS 1 & 2: 0
SUM OF ALL RESPONSES TO PHQ QUESTIONS 1-9: 0
5. POOR APPETITE OR OVEREATING: 0
SUM OF ALL RESPONSES TO PHQ QUESTIONS 1-9: 0
9. THOUGHTS THAT YOU WOULD BE BETTER OFF DEAD, OR OF HURTING YOURSELF: 0
8. MOVING OR SPEAKING SO SLOWLY THAT OTHER PEOPLE COULD HAVE NOTICED. OR THE OPPOSITE, BEING SO FIGETY OR RESTLESS THAT YOU HAVE BEEN MOVING AROUND A LOT MORE THAN USUAL: 0

## 2023-02-07 ASSESSMENT — ENCOUNTER SYMPTOMS
ABDOMINAL PAIN: 0
SHORTNESS OF BREATH: 0
BLOOD IN STOOL: 0

## 2023-03-03 ENCOUNTER — OFFICE VISIT (OUTPATIENT)
Dept: ORTHOPEDIC SURGERY | Age: 78
End: 2023-03-03

## 2023-03-03 DIAGNOSIS — M54.2 NECK PAIN: ICD-10-CM

## 2023-03-03 DIAGNOSIS — M25.512 BILATERAL SHOULDER PAIN, UNSPECIFIED CHRONICITY: Primary | ICD-10-CM

## 2023-03-03 DIAGNOSIS — M25.511 BILATERAL SHOULDER PAIN, UNSPECIFIED CHRONICITY: Primary | ICD-10-CM

## 2023-03-03 RX ORDER — METHYLPREDNISOLONE ACETATE 40 MG/ML
80 INJECTION, SUSPENSION INTRA-ARTICULAR; INTRALESIONAL; INTRAMUSCULAR; SOFT TISSUE ONCE
Status: COMPLETED | OUTPATIENT
Start: 2023-03-03 | End: 2023-03-03

## 2023-03-03 RX ADMIN — METHYLPREDNISOLONE ACETATE 80 MG: 40 INJECTION, SUSPENSION INTRA-ARTICULAR; INTRALESIONAL; INTRAMUSCULAR; SOFT TISSUE at 11:28

## 2023-03-03 NOTE — PROGRESS NOTES
Name: Jose Cruz Rayo  YOB: 1945  Gender: female  MRN: [id number]    CC:   Chief Complaint   Patient presents with    Shoulder Pain     Bilateral shoulder pain   , Bilateral shoulder(s) pain, stiffness    HPI:  This patient presents with a chronic history of Left and right shoulder pain and limited motion. Also has some neck pain. Occasional burning on the left side of her neck more than the right. Denies bowel or bladder habits. Pain in the left shoulder is superior at the [facility] joint is worse than the right. She is right-handed. Still is very active in [employer]. Her bra straps really hurt.       Allergies   Allergen Reactions    Gluten Meal Nausea Only    Milk Protein Nausea Only     Past Medical History:   Diagnosis Date    Atrial fibrillation (HCC)     Depression with anxiety     Diverticulosis of colon     Essential hypertension     GERD (gastroesophageal reflux disease)     Hashimoto's thyroiditis     Hypercholesterolemia     Migraine     Mitral valve prolapse     Multiple thyroid nodules     Peptic ulcer of stomach     Postmenopausal     Primary hypothyroidism     Spider telangiectasis      Past Surgical History:   Procedure Laterality Date    APPENDECTOMY  age 27    BREAST BIOPSY      BREAST RECONSTRUCTION Bilateral 1990    CHOLECYSTECTOMY  2008    COLONOSCOPY      COLONOSCOPY N/A 2/15/2021    COLONOSCOPY/ BMI 30 performed by Cisco Keating MD at [address] [place] [address]  06/2014    sinus surgery    HERNIA REPAIR  age 41    HYSTERECTOMY (CERVIX STATUS UNKNOWN)  age 30    UPPER GASTROINTESTINAL ENDOSCOPY  3/2016     Family History   Problem Relation Age of Onset    Lung Cancer Father     Thyroid Cancer Maternal Cousin     Breast Cancer Maternal Aunt     Breast Cancer Maternal Cousin     Heart Disease Mother     Broken Bones Mother      Social History     Socioeconomic History    Marital status:      Spouse name: Not on file    Number of children: Not on file    Years of education: Not on file    Highest education level: Not on file   Occupational History    Not on file   Tobacco Use    Smoking status: Never    Smokeless tobacco: Never   Vaping Use    Vaping Use: Never used   Substance and Sexual Activity    Alcohol use: No    Drug use: No    Sexual activity: Not on file   Other Topics Concern    Not on file   Social History Narrative    Not on file     Social Determinants of Health     Financial Resource Strain: Low Risk     Difficulty of Paying Living Expenses: Not very hard   Food Insecurity: No Food Insecurity    Worried About Running Out of Food in the Last Year: Never true    Ran Out of Food in the Last Year: Never true   Transportation Needs: Unknown    Lack of Transportation (Medical): Not on file    Lack of Transportation (Non-Medical): No   Physical Activity: Not on file   Stress: Not on file   Social Connections: Not on file   Intimate Partner Violence: Not on file   Housing Stability: Unknown    Unable to Pay for Housing in the Last Year: Not on file    Number of Places Lived in the Last Year: Not on file    Unstable Housing in the Last Year: No        No flowsheet data found. Review of Systems  A-fib, mitral valve prolapse, reflux, thyroid disorder  Also noted on the patient medical history form on the chart and are reviewed today. Pertinent positives and negatives are addressed with the patient, particularly those related to musculoskeletal concerns. Non-orthopaedic concerns were referred back to the primary care physician. PE:    GEN: General appearance is that of a healthy patient, alert and oriented, in no distress. WDWN. HEENT:  Normocephalic, atraumatic. Extraocular muscles are intact. Neck:  Supple, no lymphadenopathy. Heart:  Regular pulse exam on all extremities. Good color and warmth noted. Lungs:  Normal non-labored breathing with no obvious shortness of breath. Abdomen:  WNL's. Skin:  No signs of rash or bruising. Pysch:  Answers questions appropriately, AO X 3. MSK:  Cervical spine: No tenderness. Decreased passive and active motion. Positive Spurling's maneuver. SHOULDER   Left   Right   Skin Intact Intact   Radial Pulses 2+ symmetrical  2+ symmetrical   Myotomes Normal Normal   Dermatomes  Normal Normal   ROM Full Full   Strength Normal Normal   Atrophy None noted None noted   Effusion/Swelling  None None   Palpation Very tender to palpation over the [facility] joint Mildly tender to palpation over the [facility] joint   Bicep Tendon Rupture  Negative Negative   Bear Hug, Belly Press Negative Negative   Crossed Arm Adduction Test Not tested Not tested   Instability/Ant. Apprehension Test None  None   Impingement limited limited          X-rays:   AP, Grashey, outlet and axillary views of the right  and left shoulder were obtained today and reviewed in the office. They show advanced AC arthritis in the left worse than the right. No severe rotator cuff pathology. No severe glenohumeral arthritic change. X-ray impression:  Right shoulder AC arthritis, Left shoulder AC arthritis    Cervical spine AP x-ray and lateral x-ray obtained today and reviewed. It shows see 4-5 and C5-6 as well as see 6-7 degenerative disc disease without spondylolisthesis. Loss of kyphosis. A/Plan:     ICD-10-CM    1. Bilateral shoulder pain, unspecified chronicity  M25.511 XR SHOULDER BILATERAL STANDARD    M25.512       2. Neck pain  M54.2 XR CERVICAL SPINE (2-3 VIEWS)          I had a long discussion with the patient regarding the natural history of the problem, as well as treatment options. Treatment:   Will refer to spine after her MRI. Discussed MRI to evaluate for left further advanced conservative treatment given the chronicity of her symptoms.   PROCEDURE NOTE:  After discussion of risks and benefits including, but not limited to pain, infection, steroid flare, fat necrosis, skin discoloration, and injury to blood vessels or nerves, the patient verbally consented to proceed with an acromioclavicular [facility]) joint injection. The affected left shoulder was sterilely prepped in standard fashion and injected with 2 cc of 1% Lidocaine in the subcutanesou tissue with a 25 gauge needle. The [facility] joint was then penetrated with the needle and 1 cc of Depo-Medrol (40mg/ml) was injected into the joint. The patient tolerated the injection well. PROCEDURE NOTE:  After discussion of risks and benefits including, but not limited to pain, infection, steroid flare, fat necrosis, skin discoloration, and injury to blood vessels or nerves, the patient verbally consented to proceed with an acromioclavicular [facility]) joint injection. The affected right shoulder was sterilely prepped in standard fashion and injected with 2 cc of 1% Lidocaine in the subcutanesou tissue with a 25 gauge needle. The [facility] joint was then penetrated with the needle and 1 cc of Depo-Medrol (40mg/ml) was injected into the joint. The patient tolerated the injection well. Return in about 6 weeks (around 4/14/2023) for Refer to spine. Thank you for the opportunity to see and take care of your patients. If you ever have any questions please give me a call.    Sincerely,  Adolfo Eugene MD  03/03/23

## 2023-03-17 ENCOUNTER — OFFICE VISIT (OUTPATIENT)
Dept: ORTHOPEDIC SURGERY | Age: 78
End: 2023-03-17

## 2023-03-17 DIAGNOSIS — M25.512 BILATERAL SHOULDER PAIN, UNSPECIFIED CHRONICITY: Primary | ICD-10-CM

## 2023-03-17 DIAGNOSIS — M47.812 OSTEOARTHRITIS OF CERVICAL SPINE, UNSPECIFIED SPINAL OSTEOARTHRITIS COMPLICATION STATUS: ICD-10-CM

## 2023-03-17 DIAGNOSIS — M54.2 NECK PAIN: ICD-10-CM

## 2023-03-17 DIAGNOSIS — M25.511 BILATERAL SHOULDER PAIN, UNSPECIFIED CHRONICITY: Primary | ICD-10-CM

## 2023-03-17 NOTE — PROGRESS NOTES
Name: Fernando Guardado  YOB: 1945  Gender: female  MRN: [id number]    CC:   Chief Complaint   Patient presents with    Follow-up     C-spine MRI results          HPI: Patient presents for follow-up of bilateral shoulder and neck. Patient had bilateral AC injections on 3-3-2023 and notes mild relief with injection. Patient notes still continued pain especially with the bra straps. She states if she did not have to wear a bra she would feel 50% better. Notes still a lot of neck pain with migraines. Notes it will radiate to the shoulder and goes does the arm to the hands. Does also have numbness and tingling left side worse than right. Recall: This patient presents with a chronic history of Left and right shoulder pain and limited motion. Also has some neck pain. Occasional burning on the left side of her neck more than the right. Denies bowel or bladder habits. Pain in the left shoulder is superior at the [facility] joint is worse than the right. She is right-handed. Still is very active in [employer]. Her bra straps really hurt.       Allergies   Allergen Reactions    Gluten Meal Nausea Only    Milk Protein Nausea Only     Past Medical History:   Diagnosis Date    Atrial fibrillation ([address]     Depression with anxiety     Diverticulosis of colon     Essential hypertension     GERD (gastroesophageal reflux disease)     Hashimoto's thyroiditis     Hypercholesterolemia     Migraine     Mitral valve prolapse     Multiple thyroid nodules     Peptic ulcer of stomach     Postmenopausal     Primary hypothyroidism     Spider telangiectasis      Past Surgical History:   Procedure Laterality Date    APPENDECTOMY  age 27    BREAST BIOPSY      BREAST RECONSTRUCTION Bilateral 1990    CHOLECYSTECTOMY  2008    COLONOSCOPY      COLONOSCOPY N/A 2/15/2021    COLONOSCOPY/ BMI 30 performed by Chicho Madden MD at [address] [place] [address]  06/2014    sinus surgery    HERNIA REPAIR  age 41    HYSTERECTOMY (CERVIX STATUS UNKNOWN)  age 30    UPPER GASTROINTESTINAL ENDOSCOPY  3/2016     Family History   Problem Relation Age of Onset    Lung Cancer Father     Thyroid Cancer Maternal Cousin     Breast Cancer Maternal Aunt     Breast Cancer Maternal Cousin     Heart Disease Mother     Broken Bones Mother      Social History     Socioeconomic History    Marital status:      Spouse name: Not on file    Number of children: Not on file    Years of education: Not on file    Highest education level: Not on file   Occupational History    Not on file   Tobacco Use    Smoking status: Never    Smokeless tobacco: Never   Vaping Use    Vaping Use: Never used   Substance and Sexual Activity    Alcohol use: No    Drug use: No    Sexual activity: Not on file   Other Topics Concern    Not on file   Social History Narrative    Not on file     Social Determinants of Health     Financial Resource Strain: Low Risk     Difficulty of Paying Living Expenses: Not very hard   Food Insecurity: No Food Insecurity    Worried About Running Out of Food in the Last Year: Never true    Ran Out of Food in the Last Year: Never true   Transportation Needs: Unknown    Lack of Transportation (Medical): Not on file    Lack of Transportation (Non-Medical): No   Physical Activity: Not on file   Stress: Not on file   Social Connections: Not on file   Intimate Partner Violence: Not on file   Housing Stability: Unknown    Unable to Pay for Housing in the Last Year: Not on file    Number of Places Lived in the Last Year: Not on file    Unstable Housing in the Last Year: No        No flowsheet data found. Review of Systems  Non-contributory    PE bilateral shoulder and neck :    Cervical spine: No tenderness. Decreased passive and active motion. Positive Spurling's maneuver.       SHOULDER   Left   Right   Skin Intact Intact   Radial Pulses 2+ symmetrical  2+ symmetrical   Myotomes Normal Normal   Dermatomes  Normal Normal   ROM Full Full   Strength Normal Normal   Atrophy None noted None noted   Effusion/Swelling  None None   Palpation Very tender to palpation over the [facility] [place] joint Mildly tender to palpation over the [facility] joint   Bicep Tendon Rupture  Negative Negative   Bear Hug, Belly Press Negative Negative   Crossed Arm Adduction Test Not tested Not tested   Instability/Ant. Apprehension Test None  None   Impingement limited limited                MRI cervical spine from 3-14-23:  Narrative   MR OF THE CERVICAL SPINE WITHOUT CONTRAST. Clinical Indication: Bilateral shoulder pain and headaches       Procedure: Multiplanar and multisequence MR images performed of the cervical   spine without gadolinium contrast.       Comparison: No prior       Findings: The cervical vertebral bodies are normal in height and signal. No   aggressive bone lesion noted. There is no fracture. Degenerative disc space   narrowing is present at C4-C5 and to a lesser extent at C5-C6 and C6-C7. The   cervical spinal cord is normal in signal throughout. The facet joints align   appropriately. The craniocervical junction and imaged posterior fossa are   unremarkable. Axial images:       C2-C3: No central stenosis or foramina narrowing. C3-C4: No central stenosis or foramina narrowing. C4-C5: A shallow broad-based disc osteophyte complex. This results in mild right   foramina narrowing. The left neural foramen is patent. There is no central   stenosis. C5-C6: A broad-based disc osteophyte complex. This results in severe right   foramina narrowing. The left neural foramen is patent. There is no central   stenosis. C6-C7: There is no central stenosis or foramina narrowing. C7-T1: No central stenosis or foramina narrowing. Limited evaluation the paraspinal soft tissues is unremarkable. Impression   1. Degenerative disc disease at C4-C5 results in mild right foramina narrowing.    2. Degenerative disc disease at C5-C6 results in severe right foramina   narrowing. 3. Mild degenerative disc changes at C6-C7 without stenosis                 Independent review of cervical spine MRI from 3/14/2023 was performed. She has multilevel degenerative disc disease with disc protrusions at C4-5 C5-6 and C6-7. Agree I see no spinal cord signal change. There is some impingement though on some of the views especially coronal view 5. Pretty severe right-sided foraminal narrowing and some of those levels. A/Plan:     ICD-10-CM    1. Bilateral shoulder pain, unspecified chronicity  M25.511     M25.512       2. Neck pain  M54.2       3. Osteoarthritis of cervical spine, unspecified spinal osteoarthritis complication status  P64.047            We reviewed the findings MRI. There is no current concern for myelomalacia of the spinal cord but she does have evidence of areas where cervical radiculopathy may be predominant. Reports she has right-sided foraminal stenosis or impingement but to me there is also some noticeable change on the left. I would like her to see our spine team.  In the interim we will send her to therapy to work on her neck. In regards to her shoulders we helped her order different undergarments for that. Discussed potential for surgery but will have her come back in 3 months for injections again    Return in about 3 months (around 6/17/2023) for For Injection, Refer to spine.         Heri Tabares MD  03/17/23

## 2023-03-29 ENCOUNTER — HOSPITAL ENCOUNTER (OUTPATIENT)
Dept: PHYSICAL THERAPY | Age: 78
Setting detail: RECURRING SERIES
Discharge: HOME | End: 2023-04-01
Payer: MEDICARE

## 2023-03-29 PROCEDURE — 97161 PT EVAL LOW COMPLEX 20 MIN: CPT

## 2023-03-29 PROCEDURE — 97110 THERAPEUTIC EXERCISES: CPT

## 2023-03-29 PROCEDURE — 97140 MANUAL THERAPY 1/> REGIONS: CPT

## 2023-03-29 ASSESSMENT — PAIN SCALES - GENERAL: PAINLEVEL_OUTOF10: 4

## 2023-03-29 NOTE — PROGRESS NOTES
Elena Guerra  : 1945  Primary: Medicare Part A And B (Medicare)  Secondary: [address] @ [address] [place] [address]  Phone: [phone]  Fax: [phone] Plan Frequency: 2x a week for 90 days  Plan of Care/Certification Expiration Date: 23    >PT Visit Info:  Plan Frequency: 2x a week for 90 days  Plan of Care/Certification Expiration Date: 23    Visit Count:  1    OUTPATIENT PHYSICAL THERAPY:OP NOTE TYPE: Treatment Note 3/29/2023       Episode  }Appt Desk             Treatment Diagnosis:  cervicalgia [M54.2]; Spondylosis without myelopathy or radiculopathy, cervical region [M47.812]  Medical/Referring Diagnosis:  Neck pain [M54.2]  Osteoarthritis of cervical spine, unspecified spinal osteoarthritis complication status [C74.717]  Referring Physician:  Aki Hogan MD MD Orders:  PT Eval and Treat   Date of Onset:  No data recorded   Allergies:   Gluten meal and Milk protein  Restrictions/Precautions:  Restrictions/Precautions: None  No data recorded   Interventions Planned (Treatment may consist of any combination of the following):    Current Treatment Recommendations: Strengthening; ROM; Endurance training; Neuromuscular re-education; Manual; Pain management; Home exercise program; Modalities; Dry needling; Therapeutic activities     >Subjective Comments: Pt. Reports neck pain     >Initial:     4/10>Post Session:       4/10  Medications Last Reviewed:  3/29/2023  Updated Objective Findings:  See evaluation note from today  See evaluation note from today  THERAPEUTIC EXERCISE: (10 minutes):    Exercises per grid below to improve mobility, strength, and coordination. Required moderate visual, verbal, and manual cues to promote proper body alignment, promote proper body posture, and promote proper body mechanics. Progressed resistance, range, and repetitions as indicated.    Date:  3/29/2023   Activity/Exercise

## 2023-03-29 NOTE — THERAPY EVALUATION
extending the thumb back. Pt. Would like to address chronic pain and improve ADL performance. Patient Stated Goal(s):  \"improve neck pain\"  Initial:     4/10 Post Session:     4/10  Past Medical History/Comorbidities:   Ms. Hector Guevara  has a past medical history of Atrial fibrillation ([address] Depression with anxiety, Diverticulosis of colon, Essential hypertension, GERD (gastroesophageal reflux disease), Hashimoto's thyroiditis, Hypercholesterolemia, Migraine, Mitral valve prolapse, Multiple thyroid nodules, Peptic ulcer of stomach, Postmenopausal, Primary hypothyroidism, and Spider telangiectasis. Ms. Hector Guevara  has a past surgical history that includes heent (06/2014); Upper gastrointestinal endoscopy (3/2016); hernia repair (age 41); Cholecystectomy (2008); Appendectomy (age 27); Colonoscopy; Breast reconstruction (Bilateral, 1990); Breast biopsy; Colonoscopy (N/A, 2/15/2021); and Hysterectomy (age 30). Social History/Living Environment:   Lives With: Alone  Type of Home: House     Prior Level of Function/Work/Activity:   Prior level of function: Functioned independently without limitations. Learning:   Does the patient/guardian have any barriers to learning?: No barriers  Will there be a co-learner?: No  What is the preferred language of the patient/guardian?: English  Is an  required?: No  How does the patient/guardian prefer to learn new concepts?: Listening; Reading; Demonstration     Fall Risk Scale:    Erickson Total Score: 0  Erickson Fall Risk: Low (0-24)           OBJECTIVE   Observation:       Palpation:  Upper Trapezius: Exquisitely tender Levator Scapulae: tender Posterior Cervical: tender   Sternocleidomastoid: tender Scalenes: tender Other:       Flexibility: limited upper trapezius, scalenes, pectoralis, levator scapulae      Range of Motion: Cervical in degrees  Flexion            28 pain                           Extension 20 pain                      Right                     Left

## 2023-04-04 ENCOUNTER — HOSPITAL ENCOUNTER (OUTPATIENT)
Dept: PHYSICAL THERAPY | Age: 78
Setting detail: RECURRING SERIES
Discharge: HOME | End: 2023-04-07
Payer: MEDICARE

## 2023-04-04 PROCEDURE — 97110 THERAPEUTIC EXERCISES: CPT

## 2023-04-04 PROCEDURE — 97140 MANUAL THERAPY 1/> REGIONS: CPT

## 2023-04-04 ASSESSMENT — PAIN SCALES - GENERAL: PAINLEVEL_OUTOF10: 4

## 2023-04-04 NOTE — PROGRESS NOTES
Phillip Hunter  : 1945  Primary: Medicare Part A And B (Medicare)  Secondary: [address] @ [facility] [address]  Phone: [phone]  Fax: [phone] Plan Frequency: 2x a week for 90 days  Plan of Care/Certification Expiration Date: 23      >PT Visit Info:  Plan Frequency: 2x a week for 90 days  Plan of Care/Certification Expiration Date: 23      Visit Count:  2    OUTPATIENT PHYSICAL THERAPY:OP NOTE TYPE: Treatment Note 2023       Episode  }Appt Desk             Treatment Diagnosis:  cervicalgia [M54.2]; Spondylosis without myelopathy or radiculopathy, cervical region [M47.812]  Medical/Referring Diagnosis:  Neck pain [M54.2]  Osteoarthritis of cervical spine, unspecified spinal osteoarthritis complication status [Q98.557]  Referring Physician:  Francisco Kline MD MD Orders:  PT Eval and Treat   Date of Onset:  No data recorded   Allergies:   Gluten meal and Milk protein  Restrictions/Precautions:  Restrictions/Precautions: None  No data recorded   Interventions Planned (Treatment may consist of any combination of the following):    Current Treatment Recommendations: Strengthening; ROM; Endurance training; Neuromuscular re-education; Manual; Pain management; Home exercise program; Modalities; Dry needling; Therapeutic activities     >Subjective Comments: Pt. Notes minimal soreness from initial treatment. >Initial:     4/10>Post Session:       4/10  Medications Last Reviewed:  2023  Updated Objective Findings:  None Today    THERAPEUTIC EXERCISE: (30 minutes):    Exercises per grid below to improve mobility, strength, and coordination. Required moderate visual, verbal, and manual cues to promote proper body alignment, promote proper body posture, and promote proper body mechanics. Progressed resistance, range, and repetitions as indicated.    Date:  2023   Activity/Exercise Parameters

## 2023-04-05 ENCOUNTER — HOSPITAL ENCOUNTER (OUTPATIENT)
Dept: PHYSICAL THERAPY | Age: 78
Setting detail: RECURRING SERIES
Discharge: HOME | End: 2023-04-08
Payer: MEDICARE

## 2023-04-05 PROCEDURE — 97110 THERAPEUTIC EXERCISES: CPT

## 2023-04-05 PROCEDURE — 97140 MANUAL THERAPY 1/> REGIONS: CPT

## 2023-04-05 ASSESSMENT — PAIN SCALES - GENERAL: PAINLEVEL_OUTOF10: 4

## 2023-04-05 NOTE — PROGRESS NOTES
Uziel Zhangsten, PT [facility]   5/4/2023  2:30 PM Forest Antes, PT [facility]   5/9/2023  2:30 PM Forest Antes, PT [facility]   5/11/2023  2:30 PM Forest Antes, PT [facility]   6/20/2023  1:20 PM TIMUR Johnson MD [facility]   8/1/2023  9:00 AM CA[facility] LAB [facility]   8/8/2023  1:00 PM Von Lewis MD [facility]   9/8/2023  1:45 PM Debbie Ma MD [facility]

## 2023-04-12 ENCOUNTER — APPOINTMENT (OUTPATIENT)
Dept: PHYSICAL THERAPY | Age: 78
End: 2023-04-12
Payer: MEDICARE

## 2023-04-13 ENCOUNTER — HOSPITAL ENCOUNTER (OUTPATIENT)
Dept: PHYSICAL THERAPY | Age: 78
Setting detail: RECURRING SERIES
Discharge: HOME | End: 2023-04-16
Payer: MEDICARE

## 2023-04-13 PROCEDURE — 97140 MANUAL THERAPY 1/> REGIONS: CPT

## 2023-04-13 PROCEDURE — 97110 THERAPEUTIC EXERCISES: CPT

## 2023-04-13 ASSESSMENT — PAIN SCALES - GENERAL: PAINLEVEL_OUTOF10: 4

## 2023-04-18 ENCOUNTER — HOSPITAL ENCOUNTER (OUTPATIENT)
Dept: PHYSICAL THERAPY | Age: 78
Setting detail: RECURRING SERIES
Discharge: HOME | End: 2023-04-21
Payer: MEDICARE

## 2023-04-18 PROCEDURE — 97110 THERAPEUTIC EXERCISES: CPT

## 2023-04-18 PROCEDURE — 97140 MANUAL THERAPY 1/> REGIONS: CPT

## 2023-04-18 ASSESSMENT — PAIN SCALES - GENERAL: PAINLEVEL_OUTOF10: 3

## 2023-04-18 NOTE — PROGRESS NOTES
Raine Elena  : 1945  Primary: Medicare Part A And B (Medicare)  Secondary: [address] @ [address] [place] [address]  Phone: [phone]  Fax: [phone] Plan Frequency: 2x a week for 90 days  Plan of Care/Certification Expiration Date: 23      >PT Visit Info:  Plan Frequency: 2x a week for 90 days  Plan of Care/Certification Expiration Date: 23      Visit Count:  6    OUTPATIENT PHYSICAL THERAPY:OP NOTE TYPE: Treatment Note 2023       Episode  }Appt Desk             Treatment Diagnosis:  cervicalgia [M54.2]; Spondylosis without myelopathy or radiculopathy, cervical region [M47.812]  Medical/Referring Diagnosis:  Neck pain [M54.2]  Osteoarthritis of cervical spine, unspecified spinal osteoarthritis complication status [C40.284]  Referring Physician:  Carmen Nelson MD MD Orders:  PT Eval and Treat   Date of Onset:  No data recorded   Allergies:   Gluten meal and Milk protein  Restrictions/Precautions:  Restrictions/Precautions: None  No data recorded   Interventions Planned (Treatment may consist of any combination of the following):    Current Treatment Recommendations: Strengthening; ROM; Endurance training; Neuromuscular re-education; Manual; Pain management; Home exercise program; Modalities; Dry needling; Therapeutic activities     >Subjective Comments: Pt. Notes her neck pain is improving but still problematic with activities such as vacuuming. >Initial:     3/10>Post Session:       3/10  Medications Last Reviewed:  2023  Updated Objective Findings:  None Today    THERAPEUTIC EXERCISE: (40 minutes):    Exercises per grid below to improve mobility, strength, and coordination. Required moderate visual, verbal, and manual cues to promote proper body alignment, promote proper body posture, and promote proper body mechanics. Progressed resistance, range, and repetitions as indicated.

## 2023-04-20 ENCOUNTER — HOSPITAL ENCOUNTER (OUTPATIENT)
Dept: PHYSICAL THERAPY | Age: 78
Setting detail: RECURRING SERIES
Discharge: HOME | End: 2023-04-23
Payer: MEDICARE

## 2023-04-20 ENCOUNTER — PATIENT MESSAGE (OUTPATIENT)
Dept: INTERNAL MEDICINE CLINIC | Facility: CLINIC | Age: 78
End: 2023-04-20

## 2023-04-20 DIAGNOSIS — F41.8 DEPRESSION WITH ANXIETY: ICD-10-CM

## 2023-04-20 PROCEDURE — 97140 MANUAL THERAPY 1/> REGIONS: CPT

## 2023-04-20 PROCEDURE — 97110 THERAPEUTIC EXERCISES: CPT

## 2023-04-20 RX ORDER — ALPRAZOLAM 1 MG/1
1 TABLET ORAL NIGHTLY PRN
Qty: 30 TABLET | Refills: 1 | Status: SHIPPED | OUTPATIENT
Start: 2023-04-20 | End: 2023-05-20

## 2023-04-20 ASSESSMENT — PAIN SCALES - GENERAL: PAINLEVEL_OUTOF10: 3

## 2023-04-20 NOTE — TELEPHONE ENCOUNTER
From: Rhea Lopez  To: Dr. Shena Oliva: 4/20/2023 12:46 PM EDT  Subject: Refill    I need a refill on myAlprazolam 1mg for sleep

## 2023-04-25 ENCOUNTER — HOSPITAL ENCOUNTER (OUTPATIENT)
Dept: PHYSICAL THERAPY | Age: 78
Setting detail: RECURRING SERIES
Discharge: HOME | End: 2023-04-28
Payer: MEDICARE

## 2023-04-25 PROCEDURE — 97140 MANUAL THERAPY 1/> REGIONS: CPT

## 2023-04-25 PROCEDURE — 97110 THERAPEUTIC EXERCISES: CPT

## 2023-04-25 ASSESSMENT — PAIN SCALES - GENERAL: PAINLEVEL_OUTOF10: 3

## 2023-04-25 NOTE — PROGRESS NOTES
Harley Gomez  : 1945  Primary: Medicare Part A And B (Medicare)  Secondary: [address] @ [address] [place] [address]  Phone: [phone]  Fax: [phone] Plan Frequency: 2x a week for 90 days  Plan of Care/Certification Expiration Date: 23      >PT Visit Info:  Plan Frequency: 2x a week for 90 days  Plan of Care/Certification Expiration Date: 23      Visit Count:  8    OUTPATIENT PHYSICAL THERAPY:OP NOTE TYPE: Treatment Note 2023       Episode  }Appt Desk             Treatment Diagnosis:  cervicalgia [M54.2]; Spondylosis without myelopathy or radiculopathy, cervical region [M47.812]  Medical/Referring Diagnosis:  Neck pain [M54.2]  Osteoarthritis of cervical spine, unspecified spinal osteoarthritis complication status [I40.459]  Referring Physician:  Shawanda Galicia MD MD Orders:  PT Eval and Treat   Date of Onset:  No data recorded   Allergies:   Gluten meal and Milk protein  Restrictions/Precautions:  Restrictions/Precautions: None  No data recorded   Interventions Planned (Treatment may consist of any combination of the following):    Current Treatment Recommendations: Strengthening; ROM; Endurance training; Neuromuscular re-education; Manual; Pain management; Home exercise program; Modalities; Dry needling; Therapeutic activities     >Subjective Comments: Pt. Notes experiencing a migraine over the weekend that slowed her down. Pt. Denies new neck pain. >Initial:     3/10>Post Session:       3/10  Medications Last Reviewed:  2023  Updated Objective Findings:  None Today    THERAPEUTIC EXERCISE: (40 minutes):    Exercises per grid below to improve mobility, strength, and coordination. Required moderate visual, verbal, and manual cues to promote proper body alignment, promote proper body posture, and promote proper body mechanics. Progressed resistance, range, and repetitions as indicated.

## 2023-04-27 ENCOUNTER — HOSPITAL ENCOUNTER (OUTPATIENT)
Dept: PHYSICAL THERAPY | Age: 78
Setting detail: RECURRING SERIES
Discharge: HOME | End: 2023-04-30
Payer: MEDICARE

## 2023-04-27 PROCEDURE — 97140 MANUAL THERAPY 1/> REGIONS: CPT

## 2023-04-27 PROCEDURE — 97110 THERAPEUTIC EXERCISES: CPT

## 2023-04-30 ASSESSMENT — PAIN SCALES - GENERAL: PAINLEVEL_OUTOF10: 3

## 2023-05-02 ENCOUNTER — HOSPITAL ENCOUNTER (OUTPATIENT)
Dept: PHYSICAL THERAPY | Age: 78
Setting detail: RECURRING SERIES
Discharge: HOME | End: 2023-05-05
Payer: MEDICARE

## 2023-05-02 PROCEDURE — 97110 THERAPEUTIC EXERCISES: CPT

## 2023-05-02 PROCEDURE — 97140 MANUAL THERAPY 1/> REGIONS: CPT

## 2023-05-02 ASSESSMENT — PAIN SCALES - GENERAL: PAINLEVEL_OUTOF10: 3

## 2023-05-02 NOTE — PROGRESS NOTES
Angie Soares  : 1945  Primary: Medicare Part A And B (Medicare)  Secondary: [address] @ [facility] [address]  Phone: [phone]  Fax: [phone] Plan Frequency: 2x a week for 90 days  Plan of Care/Certification Expiration Date: 23      PT Visit Info:  Plan Frequency: 2x a week for 90 days  Plan of Care/Certification Expiration Date: 23      Visit Count:  10                OUTPATIENT PHYSICAL THERAPY:             OP NOTE TYPE: Progress Report 2023               Episode (neck pain) Appt Desk         Treatment Diagnosis:   cervicalgia [M54.2]; Spondylosis without myelopathy or radiculopathy, cervical region [M47.812]  Medical/Referring Diagnosis:  Neck pain [M54.2]  Osteoarthritis of cervical spine, unspecified spinal osteoarthritis complication status [N99.939]  Referring Physician:  Mayuri Ogden MD MD Orders:  PT Eval and Treat include dry needling  Return MD Appt:  TBD  Date of Onset:     Chronic  Allergies:  Gluten meal and Milk protein  Restrictions/Precautions:    Restrictions/Precautions: None      Medications Last Reviewed:  2023     SUBJECTIVE   History of Injury/Illness (Reason for Referral):  Pt. Attends PT c/o chronic neck and shoulder pain since she was in her 40's. Pt. Notes symptoms have gradually worsened over the years. Pain is located along B upper trapezius musculature and along the posterior neck. Pt. Reports she has trouble wearing a bra because the bra straps are so sensitive. Pt. Also reports headache symptoms with at least one severe episode a week. Pt. Underwent reconstructive surgery following B mastectomy in  and feels this may be impacting chronic neck pain. Secondary complaints include B shoulder pain that is being managed with injections from Dr. Meredith Titus.   Occasional symptoms include pain down the arm bilaterally, L side > R side, often provoked with
8/1/2023  9:00 AM CA[facility] LAB [facility]   8/8/2023  1:00 PM Sandra Carolina MD [facility]   9/8/2023  1:45 PM Loretta More MD [facility] AMB

## 2023-05-04 ENCOUNTER — HOSPITAL ENCOUNTER (OUTPATIENT)
Dept: PHYSICAL THERAPY | Age: 78
Setting detail: RECURRING SERIES
Discharge: HOME | End: 2023-05-07
Payer: MEDICARE

## 2023-05-04 PROCEDURE — 97140 MANUAL THERAPY 1/> REGIONS: CPT

## 2023-05-04 PROCEDURE — 97110 THERAPEUTIC EXERCISES: CPT

## 2023-05-04 ASSESSMENT — PAIN SCALES - GENERAL: PAINLEVEL_OUTOF10: 3

## 2023-05-09 ENCOUNTER — HOSPITAL ENCOUNTER (OUTPATIENT)
Dept: PHYSICAL THERAPY | Age: 78
Setting detail: RECURRING SERIES
Discharge: HOME | End: 2023-05-12
Payer: MEDICARE

## 2023-05-09 PROCEDURE — 97140 MANUAL THERAPY 1/> REGIONS: CPT

## 2023-05-09 PROCEDURE — 97110 THERAPEUTIC EXERCISES: CPT

## 2023-05-09 ASSESSMENT — PAIN SCALES - GENERAL: PAINLEVEL_OUTOF10: 3

## 2023-05-09 NOTE — PROGRESS NOTES
Disha Grossman  : 1945  Primary: Medicare Part A And B (Medicare)  Secondary: [address] @ [address] [place] [address]  Phone: [phone]  Fax: [phone] Plan Frequency: 2x a week for 90 days  Plan of Care/Certification Expiration Date: 23      >PT Visit Info:  Plan Frequency: 2x a week for 90 days  Plan of Care/Certification Expiration Date: 23      Visit Count:  12    OUTPATIENT PHYSICAL THERAPY:OP NOTE TYPE: Treatment Note 2023       Episode  }Appt Desk             Treatment Diagnosis:  cervicalgia [M54.2]; Spondylosis without myelopathy or radiculopathy, cervical region [M47.812]  Medical/Referring Diagnosis:  Neck pain [M54.2]  Osteoarthritis of cervical spine, unspecified spinal osteoarthritis complication status [Y23.804]  Referring Physician:  Marline Villagomez MD MD Orders:  PT Eval and Treat   Date of Onset:  No data recorded   Allergies:   Gluten meal and Milk protein  Restrictions/Precautions:  Restrictions/Precautions: None  No data recorded   Interventions Planned (Treatment may consist of any combination of the following):    Current Treatment Recommendations: Strengthening; ROM; Endurance training; Neuromuscular re-education; Manual; Pain management; Home exercise program; Modalities; Dry needling; Therapeutic activities     >Subjective Comments: Pt. Reports a couple days of soreness following last PT session. Soreness was located in the shoulder blade region. Sandro Babak >Initial:     3/10>Post Session:       3/10  Medications Last Reviewed:  2023  Updated Objective Findings:  None Today    THERAPEUTIC EXERCISE: (23 minutes):    Exercises per grid below to improve mobility, strength, and coordination. Required moderate visual, verbal, and manual cues to promote proper body alignment, promote proper body posture, and promote proper body mechanics.   Progressed resistance, range, and [FreeTextEntry1] : Hair loss. Laboratory results from October 2012 significant for low biotin, borderline low folic acid, borderline low iron saturation; TSH, free T4, total/free testosterone, dehydroepiandrosterone-sulfate, zinc within range. She has started iron, biotin, folic acid, vitamin B12, and vitamin E supplements. She has tried plasma rich platelet injections with some improvement. We discussed that first line therapy for hair loss is topical minoxidil; we discussed use of minoxidil foam. We discussed the limited data for supplements for hair loss including biotin, Nutrafol, Viviscal, and collagen supplements. We can consider off-label use of spironolactone or finasteride after six months as needed. \par \par Borderline elevated sex hormone binding globulin. She is having regular menstrual periods. Total and free testosterone levels within range. We discussed that the normal range for any laboratory assay is the average for healthy individuals +/- one standard deviation, and that 5% of healthy individuals will have a laboratory result outside of the normal range. No further evaluation needed at this time. \par \par She can return to see me as needed. \par \par CC:\par Dr. Devon Lerma, Fax [phone]

## 2023-05-11 ENCOUNTER — HOSPITAL ENCOUNTER (OUTPATIENT)
Dept: PHYSICAL THERAPY | Age: 78
Setting detail: RECURRING SERIES
End: 2023-05-11
Payer: MEDICARE

## 2023-05-16 ENCOUNTER — HOSPITAL ENCOUNTER (OUTPATIENT)
Dept: PHYSICAL THERAPY | Age: 78
Setting detail: RECURRING SERIES
Discharge: HOME | End: 2023-05-19
Payer: MEDICARE

## 2023-05-16 PROCEDURE — 97110 THERAPEUTIC EXERCISES: CPT

## 2023-05-16 PROCEDURE — 97140 MANUAL THERAPY 1/> REGIONS: CPT

## 2023-05-17 ASSESSMENT — PAIN SCALES - GENERAL: PAINLEVEL_OUTOF10: 3

## 2023-05-23 ENCOUNTER — HOSPITAL ENCOUNTER (OUTPATIENT)
Dept: PHYSICAL THERAPY | Age: 78
Setting detail: RECURRING SERIES
Discharge: HOME | End: 2023-05-26
Payer: MEDICARE

## 2023-05-23 PROCEDURE — 97110 THERAPEUTIC EXERCISES: CPT

## 2023-05-23 PROCEDURE — 97140 MANUAL THERAPY 1/> REGIONS: CPT

## 2023-05-23 ASSESSMENT — PAIN SCALES - GENERAL: PAINLEVEL_OUTOF10: 3

## 2023-05-23 NOTE — PROGRESS NOTES
Laura Ho  : 1945  Primary: Medicare Part A And B (Medicare)  Secondary: [address] @ [address] [place] [id number]  Phone: [phone]  Fax: [phone] Plan Frequency: 2x a week for 90 days  Plan of Care/Certification Expiration Date: 23      >PT Visit Info:  Plan Frequency: 2x a week for 90 days  Plan of Care/Certification Expiration Date: 23      Visit Count:  14    OUTPATIENT PHYSICAL THERAPY:OP NOTE TYPE: Treatment Note 2023       Episode  }Appt Desk             Treatment Diagnosis:  cervicalgia [M54.2]; Spondylosis without myelopathy or radiculopathy, cervical region [M47.812]  Medical/Referring Diagnosis:  Neck pain [M54.2]  Osteoarthritis of cervical spine, unspecified spinal osteoarthritis complication status [C92.444]  Referring Physician:  Thais Judge MD MD Orders:  PT Eval and Treat   Date of Onset:  No data recorded   Allergies:   Gluten meal and Milk protein  Restrictions/Precautions:  Restrictions/Precautions: None  No data recorded   Interventions Planned (Treatment may consist of any combination of the following):    Current Treatment Recommendations: Strengthening; ROM; Endurance training; Neuromuscular re-education; Manual; Pain management; Home exercise program; Modalities; Dry needling; Therapeutic activities     >Subjective Comments: Pt. Reports she was able to perform work and shopping yesterday with less limitations from neck and shoulder pain. >Initial:     3/10>Post Session:       3/10  Medications Last Reviewed:  2023  Updated Objective Findings:   mild myofascial trigger points in B upper trapezius. THERAPEUTIC EXERCISE: (40 minutes):    Exercises per grid below to improve mobility, strength, and coordination. Required moderate visual, verbal, and manual cues to promote proper body alignment, promote proper body posture, and promote proper body mechanics.

## 2023-05-25 ENCOUNTER — HOSPITAL ENCOUNTER (OUTPATIENT)
Dept: PHYSICAL THERAPY | Age: 78
Setting detail: RECURRING SERIES
Discharge: HOME | End: 2023-05-28
Payer: MEDICARE

## 2023-05-25 PROCEDURE — 97140 MANUAL THERAPY 1/> REGIONS: CPT

## 2023-05-25 PROCEDURE — 97110 THERAPEUTIC EXERCISES: CPT

## 2023-05-25 ASSESSMENT — PAIN SCALES - GENERAL: PAINLEVEL_OUTOF10: 3

## 2023-05-30 ENCOUNTER — HOSPITAL ENCOUNTER (OUTPATIENT)
Dept: PHYSICAL THERAPY | Age: 78
Setting detail: RECURRING SERIES
Discharge: HOME | End: 2023-06-02
Payer: MEDICARE

## 2023-05-30 PROCEDURE — 97140 MANUAL THERAPY 1/> REGIONS: CPT

## 2023-05-30 PROCEDURE — 97110 THERAPEUTIC EXERCISES: CPT

## 2023-05-30 ASSESSMENT — PAIN SCALES - GENERAL: PAINLEVEL_OUTOF10: 3

## 2023-05-30 NOTE — PROGRESS NOTES
Marie Juares  : 1945  Primary: Medicare Part A And B (Medicare)  Secondary: [address] @ [address] [place] [address]  Phone: [phone]  Fax: [phone] Plan Frequency: 2x a week for 90 days  Plan of Care/Certification Expiration Date: 23      >PT Visit Info:  Plan Frequency: 2x a week for 90 days  Plan of Care/Certification Expiration Date: 23      Visit Count:  16    OUTPATIENT PHYSICAL THERAPY:OP NOTE TYPE: Treatment Note 2023       Episode  }Appt Desk             Treatment Diagnosis:  cervicalgia [M54.2]; Spondylosis without myelopathy or radiculopathy, cervical region [M47.812]  Medical/Referring Diagnosis:  Neck pain [M54.2]  Osteoarthritis of cervical spine, unspecified spinal osteoarthritis complication status [Q10.114]  Referring Physician:  Dannie Jolley MD MD Orders:  PT Eval and Treat   Date of Onset:  No data recorded   Allergies:   Gluten meal and Milk protein  Restrictions/Precautions:  Restrictions/Precautions: None  No data recorded   Interventions Planned (Treatment may consist of any combination of the following):    Current Treatment Recommendations: Strengthening; ROM; Endurance training; Neuromuscular re-education; Manual; Pain management; Home exercise program; Modalities; Dry needling; Therapeutic activities     >Subjective Comments: Pt. Reports a day of soreness following dry needling last session. >Initial:     3/10>Post Session:       3/10  Medications Last Reviewed:  2023  Updated Objective Findings:   flexibility limitations remain present in the pectoralis and scalene musculature    THERAPEUTIC EXERCISE: (40 minutes):    Exercises per grid below to improve mobility, strength, and coordination. Required moderate visual, verbal, and manual cues to promote proper body alignment, promote proper body posture, and promote proper body mechanics.   Progressed

## 2023-06-01 ENCOUNTER — HOSPITAL ENCOUNTER (OUTPATIENT)
Dept: PHYSICAL THERAPY | Age: 78
Setting detail: RECURRING SERIES
Discharge: HOME | End: 2023-06-04
Payer: MEDICARE

## 2023-06-01 PROCEDURE — 97140 MANUAL THERAPY 1/> REGIONS: CPT

## 2023-06-01 PROCEDURE — 97110 THERAPEUTIC EXERCISES: CPT

## 2023-06-01 ASSESSMENT — PAIN SCALES - GENERAL: PAINLEVEL_OUTOF10: 3

## 2023-06-06 ENCOUNTER — HOSPITAL ENCOUNTER (OUTPATIENT)
Dept: PHYSICAL THERAPY | Age: 78
Setting detail: RECURRING SERIES
Discharge: HOME | End: 2023-06-09
Payer: MEDICARE

## 2023-06-06 PROCEDURE — 97140 MANUAL THERAPY 1/> REGIONS: CPT

## 2023-06-06 PROCEDURE — 97110 THERAPEUTIC EXERCISES: CPT

## 2023-06-06 ASSESSMENT — PAIN SCALES - GENERAL: PAINLEVEL_OUTOF10: 3

## 2023-06-06 NOTE — PROGRESS NOTES
1:30 PM Thea Martin, PT [facility]   6/28/2023  1:45 PM Thea Martin, PT [facility]   7/5/2023  2:30 PM Thea Martin, PT [facility]   8/1/2023  9:00 AM [facility]   8/8/2023  1:00 PM Leela Cohen MD [facility]   9/8/2023  1:45 PM Joan Toledo MD [facility]

## 2023-06-11 DIAGNOSIS — M25.512 PAIN IN JOINT OF LEFT SHOULDER: ICD-10-CM

## 2023-06-12 RX ORDER — MELOXICAM 15 MG/1
15 TABLET ORAL DAILY
Qty: 90 TABLET | Refills: 0 | OUTPATIENT
Start: 2023-06-12

## 2023-06-21 ENCOUNTER — HOSPITAL ENCOUNTER (OUTPATIENT)
Dept: PHYSICAL THERAPY | Age: 78
Setting detail: RECURRING SERIES
Discharge: HOME | End: 2023-06-24
Payer: MEDICARE

## 2023-06-21 PROCEDURE — 97140 MANUAL THERAPY 1/> REGIONS: CPT

## 2023-06-21 PROCEDURE — 97110 THERAPEUTIC EXERCISES: CPT

## 2023-06-21 ASSESSMENT — PAIN SCALES - GENERAL: PAINLEVEL_OUTOF10: 4

## 2023-06-21 NOTE — PROGRESS NOTES
Rosalio Ruiz  : 1945  Primary: Medicare Part A And B (Medicare)  Secondary: [address] @ [facility] [address]  Phone: [phone]  Fax: [phone] Plan Frequency: 2x a week for 6 weeks  Plan of Care/Certification Expiration Date: 23      >PT Visit Info:  Plan Frequency: 2x a week for 6 weeks  Plan of Care/Certification Expiration Date: 23      Visit Count:  21    OUTPATIENT PHYSICAL THERAPY:OP NOTE TYPE: Treatment Note 2023       Episode  }Appt Desk             Treatment Diagnosis:  cervicalgia [M54.2]; Spondylosis without myelopathy or radiculopathy, cervical region [M47.812]  Medical/Referring Diagnosis:  Neck pain [M54.2]  Osteoarthritis of cervical spine, unspecified spinal osteoarthritis complication status [U86.620]  Referring Physician:  Maritza Shirley MD MD Orders:  PT Eval and Treat   Date of Onset:  No data recorded   Allergies:   Gluten meal and Milk protein  Restrictions/Precautions:  Restrictions/Precautions: None  No data recorded   Interventions Planned (Treatment may consist of any combination of the following):    Current Treatment Recommendations: Strengthening; ROM; Endurance training; Neuromuscular re-education; Manual; Pain management; Home exercise program; Modalities; Dry needling; Therapeutic activities     >Subjective Comments: Pt. Reports neck pain and near headache today secondary to the weather. >Initial:     4/10>Post Session:       3/10  Medications Last Reviewed:  2023  Updated Objective Findings:  None Today    THERAPEUTIC EXERCISE: (40 minutes):    Exercises per grid below to improve mobility, strength, and coordination. Required moderate visual, verbal, and manual cues to promote proper body alignment, promote proper body posture, and promote proper body mechanics. Progressed resistance, range, and repetitions as indicated.    Date:  2023

## 2023-06-28 ENCOUNTER — HOSPITAL ENCOUNTER (OUTPATIENT)
Dept: PHYSICAL THERAPY | Age: 78
Setting detail: RECURRING SERIES
Discharge: HOME | End: 2023-07-01
Payer: MEDICARE

## 2023-06-28 DIAGNOSIS — F41.8 DEPRESSION WITH ANXIETY: ICD-10-CM

## 2023-06-28 PROCEDURE — 97140 MANUAL THERAPY 1/> REGIONS: CPT

## 2023-06-28 PROCEDURE — 97110 THERAPEUTIC EXERCISES: CPT

## 2023-06-28 RX ORDER — ALPRAZOLAM 1 MG/1
1 TABLET ORAL NIGHTLY PRN
Qty: 30 TABLET | Refills: 1 | Status: SHIPPED | OUTPATIENT
Start: 2023-06-28 | End: 2023-08-27

## 2023-06-28 ASSESSMENT — PAIN SCALES - GENERAL: PAINLEVEL_OUTOF10: 3

## 2023-07-25 ENCOUNTER — OFFICE VISIT (OUTPATIENT)
Dept: ORTHOPEDIC SURGERY | Age: 78
End: 2023-07-25

## 2023-07-25 DIAGNOSIS — M25.511 BILATERAL SHOULDER PAIN, UNSPECIFIED CHRONICITY: Primary | ICD-10-CM

## 2023-07-25 DIAGNOSIS — M47.812 OSTEOARTHRITIS OF CERVICAL SPINE, UNSPECIFIED SPINAL OSTEOARTHRITIS COMPLICATION STATUS: ICD-10-CM

## 2023-07-25 DIAGNOSIS — M25.512 BILATERAL SHOULDER PAIN, UNSPECIFIED CHRONICITY: Primary | ICD-10-CM

## 2023-07-25 DIAGNOSIS — M54.2 NECK PAIN: ICD-10-CM

## 2023-07-25 RX ORDER — METHYLPREDNISOLONE ACETATE 40 MG/ML
40 INJECTION, SUSPENSION INTRA-ARTICULAR; INTRALESIONAL; INTRAMUSCULAR; SOFT TISSUE ONCE
Status: COMPLETED | OUTPATIENT
Start: 2023-07-25 | End: 2023-07-25

## 2023-07-25 RX ADMIN — METHYLPREDNISOLONE ACETATE 40 MG: 40 INJECTION, SUSPENSION INTRA-ARTICULAR; INTRALESIONAL; INTRAMUSCULAR; SOFT TISSUE at 11:04

## 2023-07-25 NOTE — PROGRESS NOTES
children: Not on file    Years of education: Not on file    Highest education level: Not on file   Occupational History    Not on file   Tobacco Use    Smoking status: Never    Smokeless tobacco: Never   Vaping Use    Vaping Use: Never used   Substance and Sexual Activity    Alcohol use: No    Drug use: No    Sexual activity: Not on file   Other Topics Concern    Not on file   Social History Narrative    Not on file     Social Determinants of Health     Financial Resource Strain: Low Risk     Difficulty of Paying Living Expenses: Not very hard   Food Insecurity: No Food Insecurity    Worried About Running Out of Food in the Last Year: Never true    [address] in the Last Year: Never true   Transportation Needs: Unknown    Lack of Transportation (Medical): Not on file    Lack of Transportation (Non-Medical): No   Physical Activity: Not on file   Stress: Not on file   Social Connections: Not on file   Intimate Partner Violence: Not on file   Housing Stability: Unknown    Unable to Pay for Housing in the Last Year: Not on file    Number of Places Lived in the Last Year: Not on file    Unstable Housing in the Last Year: No        No flowsheet data found. Review of Systems  Non-contributory    PE:    Appropriate range of motion of bilateral shoulders. The left shoulder is tender to palpate AC joint. No real complaints with the right shoulder. Biggest complaint is pain located along the left paraspinal muscles and scapulothoracic joint. Distal motor function, sensation, pulse intact. Xray: Not indicated    A/Plan:     ICD-10-CM    1. Bilateral shoulder pain, unspecified chronicity  M25.511 methylPREDNISolone acetate (DEPO-MEDROL) injection 40 mg    M25.512 DRAIN/INJECT INTERMEDIATE JOINT/BURSA     Amb External Referral To Pain Medicine      2. Neck pain  M54.2 methylPREDNISolone acetate (DEPO-MEDROL) injection 40 mg     DRAIN/INJECT INTERMEDIATE JOINT/BURSA     Amb External Referral To Pain Medicine      3.

## 2023-08-01 DIAGNOSIS — E78.00 PURE HYPERCHOLESTEROLEMIA: ICD-10-CM

## 2023-08-01 DIAGNOSIS — R73.01 IMPAIRED FASTING BLOOD SUGAR: ICD-10-CM

## 2023-08-01 DIAGNOSIS — E55.9 VITAMIN D DEFICIENCY: ICD-10-CM

## 2023-08-01 DIAGNOSIS — E03.9 PRIMARY HYPOTHYROIDISM: ICD-10-CM

## 2023-08-01 LAB
25(OH)D3 SERPL-MCNC: 88.8 NG/ML (ref 30–100)
ALBUMIN SERPL-MCNC: 3.9 G/DL (ref 3.2–4.6)
ALBUMIN/GLOB SERPL: 1.3 (ref 0.4–1.6)
ALP SERPL-CCNC: 137 U/L (ref 50–136)
ALT SERPL-CCNC: 22 U/L (ref 12–65)
ANION GAP SERPL CALC-SCNC: 6 MMOL/L (ref 2–11)
AST SERPL-CCNC: 14 U/L (ref 15–37)
BILIRUB SERPL-MCNC: 0.7 MG/DL (ref 0.2–1.1)
BUN SERPL-MCNC: 21 MG/DL (ref 8–23)
CALCIUM SERPL-MCNC: 10 MG/DL (ref 8.3–10.4)
CHLORIDE SERPL-SCNC: 110 MMOL/L (ref 101–110)
CHOLEST SERPL-MCNC: 172 MG/DL
CO2 SERPL-SCNC: 29 MMOL/L (ref 21–32)
CREAT SERPL-MCNC: 1 MG/DL (ref 0.6–1)
ERYTHROCYTE [DISTWIDTH] IN BLOOD BY AUTOMATED COUNT: 13 % (ref 11.9–14.6)
GLOBULIN SER CALC-MCNC: 3.1 G/DL (ref 2.8–4.5)
GLUCOSE SERPL-MCNC: 98 MG/DL (ref 65–100)
HCT VFR BLD AUTO: 45.1 % (ref 35.8–46.3)
HDLC SERPL-MCNC: 46 MG/DL (ref 40–60)
HDLC SERPL: 3.7
HGB BLD-MCNC: 13.9 G/DL (ref 11.7–15.4)
LDLC SERPL CALC-MCNC: 96.2 MG/DL
MCH RBC QN AUTO: 29.6 PG (ref 26.1–32.9)
MCHC RBC AUTO-ENTMCNC: 30.8 G/DL (ref 31.4–35)
MCV RBC AUTO: 96.2 FL (ref 82–102)
NRBC # BLD: 0 K/UL (ref 0–0.2)
PLATELET # BLD AUTO: 269 K/UL (ref 150–450)
PMV BLD AUTO: 10.8 FL (ref 9.4–12.3)
POTASSIUM SERPL-SCNC: 4.5 MMOL/L (ref 3.5–5.1)
PROT SERPL-MCNC: 7 G/DL (ref 6.3–8.2)
RBC # BLD AUTO: 4.69 M/UL (ref 4.05–5.2)
SODIUM SERPL-SCNC: 145 MMOL/L (ref 133–143)
TRIGL SERPL-MCNC: 149 MG/DL (ref 35–150)
TSH, 3RD GENERATION: 2.27 UIU/ML (ref 0.36–3.74)
VLDLC SERPL CALC-MCNC: 29.8 MG/DL (ref 6–23)
WBC # BLD AUTO: 6.7 K/UL (ref 4.3–11.1)

## 2023-08-02 LAB
EST. AVERAGE GLUCOSE BLD GHB EST-MCNC: 120 MG/DL
HBA1C MFR BLD: 5.8 % (ref 4.8–5.6)

## 2023-08-08 ENCOUNTER — OFFICE VISIT (OUTPATIENT)
Dept: INTERNAL MEDICINE CLINIC | Facility: CLINIC | Age: 78
End: 2023-08-08
Payer: MEDICARE

## 2023-08-08 VITALS
HEIGHT: 64 IN | BODY MASS INDEX: 30.39 KG/M2 | TEMPERATURE: 97.4 F | WEIGHT: 178 LBS | OXYGEN SATURATION: 98 % | RESPIRATION RATE: 14 BRPM | SYSTOLIC BLOOD PRESSURE: 120 MMHG | DIASTOLIC BLOOD PRESSURE: 80 MMHG | HEART RATE: 84 BPM

## 2023-08-08 DIAGNOSIS — M25.512 PAIN IN JOINT OF LEFT SHOULDER: ICD-10-CM

## 2023-08-08 DIAGNOSIS — R73.01 IMPAIRED FASTING BLOOD SUGAR: ICD-10-CM

## 2023-08-08 DIAGNOSIS — Z00.00 MEDICARE ANNUAL WELLNESS VISIT, SUBSEQUENT: Primary | ICD-10-CM

## 2023-08-08 DIAGNOSIS — I10 ESSENTIAL HYPERTENSION: ICD-10-CM

## 2023-08-08 DIAGNOSIS — Z87.898 HISTORY OF MOTION SICKNESS: ICD-10-CM

## 2023-08-08 DIAGNOSIS — Z78.0 POSTMENOPAUSAL STATUS: ICD-10-CM

## 2023-08-08 DIAGNOSIS — E55.9 VITAMIN D DEFICIENCY: ICD-10-CM

## 2023-08-08 DIAGNOSIS — E03.9 PRIMARY HYPOTHYROIDISM: ICD-10-CM

## 2023-08-08 DIAGNOSIS — F41.8 DEPRESSION WITH ANXIETY: ICD-10-CM

## 2023-08-08 DIAGNOSIS — E78.00 PURE HYPERCHOLESTEROLEMIA: ICD-10-CM

## 2023-08-08 PROCEDURE — 3017F COLORECTAL CA SCREEN DOC REV: CPT | Performed by: FAMILY MEDICINE

## 2023-08-08 PROCEDURE — 3079F DIAST BP 80-89 MM HG: CPT | Performed by: FAMILY MEDICINE

## 2023-08-08 PROCEDURE — G8399 PT W/DXA RESULTS DOCUMENT: HCPCS | Performed by: FAMILY MEDICINE

## 2023-08-08 PROCEDURE — 1090F PRES/ABSN URINE INCON ASSESS: CPT | Performed by: FAMILY MEDICINE

## 2023-08-08 PROCEDURE — 99214 OFFICE O/P EST MOD 30 MIN: CPT | Performed by: FAMILY MEDICINE

## 2023-08-08 PROCEDURE — 3074F SYST BP LT 130 MM HG: CPT | Performed by: FAMILY MEDICINE

## 2023-08-08 PROCEDURE — 1123F ACP DISCUSS/DSCN MKR DOCD: CPT | Performed by: FAMILY MEDICINE

## 2023-08-08 PROCEDURE — G0439 PPPS, SUBSEQ VISIT: HCPCS | Performed by: FAMILY MEDICINE

## 2023-08-08 PROCEDURE — G8417 CALC BMI ABV UP PARAM F/U: HCPCS | Performed by: FAMILY MEDICINE

## 2023-08-08 PROCEDURE — 1036F TOBACCO NON-USER: CPT | Performed by: FAMILY MEDICINE

## 2023-08-08 PROCEDURE — G8427 DOCREV CUR MEDS BY ELIG CLIN: HCPCS | Performed by: FAMILY MEDICINE

## 2023-08-08 RX ORDER — MELOXICAM 15 MG/1
15 TABLET ORAL DAILY
Qty: 90 TABLET | Refills: 0 | Status: SHIPPED | OUTPATIENT
Start: 2023-08-08

## 2023-08-08 RX ORDER — ONDANSETRON 4 MG/1
4 TABLET, FILM COATED ORAL DAILY PRN
Qty: 30 TABLET | Refills: 0 | Status: SHIPPED | OUTPATIENT
Start: 2023-08-08

## 2023-08-08 RX ORDER — ALPRAZOLAM 1 MG/1
1 TABLET ORAL NIGHTLY PRN
Qty: 30 TABLET | Refills: 1 | Status: SHIPPED | OUTPATIENT
Start: 2023-08-08 | End: 2023-10-07

## 2023-08-08 ASSESSMENT — PATIENT HEALTH QUESTIONNAIRE - PHQ9
2. FEELING DOWN, DEPRESSED OR HOPELESS: 0
SUM OF ALL RESPONSES TO PHQ9 QUESTIONS 1 & 2: 0
6. FEELING BAD ABOUT YOURSELF - OR THAT YOU ARE A FAILURE OR HAVE LET YOURSELF OR YOUR FAMILY DOWN: 0
3. TROUBLE FALLING OR STAYING ASLEEP: 0
10. IF YOU CHECKED OFF ANY PROBLEMS, HOW DIFFICULT HAVE THESE PROBLEMS MADE IT FOR YOU TO DO YOUR WORK, TAKE CARE OF THINGS AT HOME, OR GET ALONG WITH OTHER PEOPLE: 0
7. TROUBLE CONCENTRATING ON THINGS, SUCH AS READING THE NEWSPAPER OR WATCHING TELEVISION: 0
1. LITTLE INTEREST OR PLEASURE IN DOING THINGS: 0
4. FEELING TIRED OR HAVING LITTLE ENERGY: 2
SUM OF ALL RESPONSES TO PHQ QUESTIONS 1-9: 2
SUM OF ALL RESPONSES TO PHQ QUESTIONS 1-9: 2
5. POOR APPETITE OR OVEREATING: 0
8. MOVING OR SPEAKING SO SLOWLY THAT OTHER PEOPLE COULD HAVE NOTICED. OR THE OPPOSITE, BEING SO FIGETY OR RESTLESS THAT YOU HAVE BEEN MOVING AROUND A LOT MORE THAN USUAL: 0
9. THOUGHTS THAT YOU WOULD BE BETTER OFF DEAD, OR OF HURTING YOURSELF: 0
SUM OF ALL RESPONSES TO PHQ QUESTIONS 1-9: 2
SUM OF ALL RESPONSES TO PHQ QUESTIONS 1-9: 2

## 2023-09-10 ENCOUNTER — TELEPHONE (OUTPATIENT)
Dept: INTERNAL MEDICINE CLINIC | Facility: CLINIC | Age: 78
End: 2023-09-10

## 2023-09-10 DIAGNOSIS — F41.8 DEPRESSION WITH ANXIETY: ICD-10-CM

## 2023-09-10 RX ORDER — ALPRAZOLAM 1 MG/1
1 TABLET ORAL NIGHTLY PRN
Qty: 30 TABLET | Refills: 1 | Status: CANCELLED | OUTPATIENT
Start: 2023-09-10 | End: 2023-11-09

## 2023-09-11 RX ORDER — ALPRAZOLAM 1 MG/1
1 TABLET ORAL NIGHTLY PRN
Qty: 30 TABLET | Refills: 1 | Status: SHIPPED | OUTPATIENT
Start: 2023-09-11 | End: 2023-11-10

## 2023-09-11 NOTE — TELEPHONE ENCOUNTER
Pt attempted to  Xanax refill at pharmacy, however, pharmacy is requesting to get authorization from PCP prior to filling.

## 2023-09-14 ENCOUNTER — HOSPITAL ENCOUNTER (OUTPATIENT)
Dept: MAMMOGRAPHY | Age: 78
Discharge: HOME | End: 2023-09-14
Attending: FAMILY MEDICINE
Payer: MEDICARE

## 2023-09-14 DIAGNOSIS — Z78.0 POSTMENOPAUSAL STATUS: ICD-10-CM

## 2023-09-14 PROCEDURE — 77080 DXA BONE DENSITY AXIAL: CPT

## 2023-11-13 ENCOUNTER — TELEPHONE (OUTPATIENT)
Dept: INTERNAL MEDICINE CLINIC | Facility: CLINIC | Age: 78
End: 2023-11-13

## 2023-11-13 DIAGNOSIS — F41.8 DEPRESSION WITH ANXIETY: ICD-10-CM

## 2023-11-13 RX ORDER — ALPRAZOLAM 1 MG/1
1 TABLET ORAL NIGHTLY PRN
Qty: 30 TABLET | Refills: 0 | Status: CANCELLED | OUTPATIENT
Start: 2023-11-13 | End: 2024-01-12

## 2023-11-13 RX ORDER — ALPRAZOLAM 1 MG/1
1 TABLET ORAL NIGHTLY PRN
Qty: 30 TABLET | Refills: 1 | Status: SHIPPED | OUTPATIENT
Start: 2023-11-13 | End: 2024-01-12

## 2023-12-23 DIAGNOSIS — E78.00 PURE HYPERCHOLESTEROLEMIA: ICD-10-CM

## 2023-12-27 RX ORDER — ATORVASTATIN CALCIUM 20 MG/1
20 TABLET, FILM COATED ORAL DAILY
Qty: 90 TABLET | Refills: 3 | Status: SHIPPED | OUTPATIENT
Start: 2023-12-27 | End: 2024-12-26

## 2024-01-23 ENCOUNTER — PATIENT MESSAGE (OUTPATIENT)
Dept: INTERNAL MEDICINE CLINIC | Facility: CLINIC | Age: 79
End: 2024-01-23

## 2024-01-23 DIAGNOSIS — F41.8 DEPRESSION WITH ANXIETY: Primary | ICD-10-CM

## 2024-01-23 DIAGNOSIS — G43.709 CHRONIC MIGRAINE WITHOUT AURA, NOT INTRACTABLE, WITHOUT STATUS MIGRAINOSUS: ICD-10-CM

## 2024-01-24 RX ORDER — SUMATRIPTAN 100 MG/1
100 TABLET, FILM COATED ORAL PRN
Qty: 10 TABLET | Refills: 6 | Status: SHIPPED | OUTPATIENT
Start: 2024-01-24

## 2024-01-24 RX ORDER — ALPRAZOLAM 1 MG/1
1 TABLET ORAL NIGHTLY PRN
Qty: 30 TABLET | Refills: 0 | Status: SHIPPED | OUTPATIENT
Start: 2024-01-24 | End: 2024-02-23

## 2024-01-24 NOTE — TELEPHONE ENCOUNTER
From: Tyra Phillip  To: Dr. Jeannette Hendricks  Sent: 1/23/2024 10:34 PM EST  Subject: Refill Xanax 1mg    Please refill my generic for Xanax . I need it for sleep.  I’m not scheduled to see you until March  1mg. Thanks

## 2024-01-31 DIAGNOSIS — F41.8 DEPRESSION WITH ANXIETY: ICD-10-CM

## 2024-02-01 RX ORDER — CITALOPRAM 20 MG/1
20 TABLET ORAL DAILY
Qty: 90 TABLET | Refills: 0 | Status: SHIPPED | OUTPATIENT
Start: 2024-02-01

## 2024-02-08 ENCOUNTER — OFFICE VISIT (OUTPATIENT)
Dept: ENDOCRINOLOGY | Age: 79
End: 2024-02-08

## 2024-02-08 VITALS
DIASTOLIC BLOOD PRESSURE: 70 MMHG | OXYGEN SATURATION: 98 % | WEIGHT: 178 LBS | HEART RATE: 88 BPM | SYSTOLIC BLOOD PRESSURE: 115 MMHG | BODY MASS INDEX: 30.55 KG/M2

## 2024-02-08 DIAGNOSIS — E03.9 PRIMARY HYPOTHYROIDISM: ICD-10-CM

## 2024-02-08 DIAGNOSIS — E04.2 MULTIPLE THYROID NODULES: ICD-10-CM

## 2024-02-08 DIAGNOSIS — Z80.8 FAMILY HISTORY OF THYROID CANCER: ICD-10-CM

## 2024-02-08 DIAGNOSIS — E06.3 HASHIMOTO'S THYROIDITIS: ICD-10-CM

## 2024-02-08 DIAGNOSIS — E03.9 PRIMARY HYPOTHYROIDISM: Primary | ICD-10-CM

## 2024-02-08 LAB
T4 FREE SERPL-MCNC: 1.2 NG/DL (ref 0.78–1.46)
TSH, 3RD GENERATION: 1.8 UIU/ML (ref 0.36–3.74)

## 2024-02-08 RX ORDER — LEVOTHYROXINE SODIUM 0.05 MG/1
50 TABLET ORAL
Qty: 77 TABLET | Refills: 3
Start: 2024-02-08 | End: 2024-02-09 | Stop reason: SDUPTHER

## 2024-02-08 ASSESSMENT — ENCOUNTER SYMPTOMS
TROUBLE SWALLOWING: 1
DIARRHEA: 0
CONSTIPATION: 0
VOICE CHANGE: 1

## 2024-02-08 NOTE — PROGRESS NOTES
mouth every 6 hours as needed (abdominal cramps) 120 tablet 3    ondansetron (ZOFRAN) 8 MG tablet Take 1 tablet by mouth every 8 hours as needed for Nausea or Vomiting 15 tablet 1    ketoconazole (NIZORAL) 2 % cream Apply  to affected area daily. - Topical 60 g 0    sucralfate (CARAFATE) 1 GM tablet Take 1 tablet by mouth 4 times daily 120 tablet 5    aspirin 81 MG EC tablet Take 1 tablet by mouth daily      Cholecalciferol 50 MCG (2000 UT) TABS Take by mouth      diclofenac sodium (VOLTAREN) 1 % GEL Apply 4 g topically 4 times daily (Patient not taking: Reported on 2/8/2024) 350 g 0    ondansetron (ZOFRAN-ODT) 4 MG disintegrating tablet Take 1 tablet by mouth 3 times daily as needed for Nausea or Vomiting (Patient not taking: Reported on 2/8/2024) 21 tablet 0    mupirocin (BACTROBAN) 2 % ointment Apply topically daily (Patient not taking: Reported on 2/8/2024) 22 g 0     No current facility-administered medications for this visit.       FOREST Amado MD, FACE      Portions of this note were generated with the assistance of voice recognition software.  As such, some errors in transcription may be present.

## 2024-02-09 DIAGNOSIS — E03.9 PRIMARY HYPOTHYROIDISM: ICD-10-CM

## 2024-02-09 RX ORDER — LEVOTHYROXINE SODIUM 0.05 MG/1
50 TABLET ORAL
Qty: 77 TABLET | Refills: 3 | Status: SHIPPED | OUTPATIENT
Start: 2024-02-09

## 2024-03-07 ENCOUNTER — TELEPHONE (OUTPATIENT)
Dept: INTERNAL MEDICINE CLINIC | Facility: CLINIC | Age: 79
End: 2024-03-07

## 2024-03-07 NOTE — TELEPHONE ENCOUNTER
----- Message from Angeline Hall sent at 3/6/2024  2:12 PM EST -----  Subject: Refill Request    QUESTIONS  Name of Medication? Other - Meloxicam  Patient-reported dosage and instructions? 15mg  How many days do you have left? 4  Preferred Pharmacy? [id number]  Pharmacy phone number (if available)? [phone]  Additional Information for Provider? Pt stated will not have enough to   make it to appt time which is on 3/21 and want to know if some can be   called in until appt time .  ---------------------------------------------------------------------------  --------------  CALL BACK INFO  What is the best way for the office to contact you? OK to leave message on   voicemail  Preferred Call Back Phone Number? [phone]  ---------------------------------------------------------------------------  --------------  SCRIPT ANSWERS  Relationship to Patient? Self

## 2024-03-07 NOTE — TELEPHONE ENCOUNTER
Patient stated that she has enough refills until next appointment with PCP.   No need for refills at time.

## 2024-03-12 DIAGNOSIS — I10 ESSENTIAL HYPERTENSION: ICD-10-CM

## 2024-03-12 DIAGNOSIS — E78.00 PURE HYPERCHOLESTEROLEMIA: ICD-10-CM

## 2024-03-12 DIAGNOSIS — K21.9 GASTROESOPHAGEAL REFLUX DISEASE, UNSPECIFIED WHETHER ESOPHAGITIS PRESENT: ICD-10-CM

## 2024-03-13 RX ORDER — METOPROLOL SUCCINATE 100 MG/1
100 TABLET, EXTENDED RELEASE ORAL DAILY
Qty: 90 TABLET | Refills: 3 | Status: SHIPPED | OUTPATIENT
Start: 2024-03-13

## 2024-03-13 RX ORDER — ATORVASTATIN CALCIUM 20 MG/1
20 TABLET, FILM COATED ORAL DAILY
Qty: 90 TABLET | Refills: 3 | Status: SHIPPED | OUTPATIENT
Start: 2024-03-13 | End: 2025-03-13

## 2024-03-13 RX ORDER — PANTOPRAZOLE SODIUM 40 MG/1
40 TABLET, DELAYED RELEASE ORAL DAILY
Qty: 90 TABLET | Refills: 3 | Status: SHIPPED | OUTPATIENT
Start: 2024-03-13

## 2024-03-14 ENCOUNTER — NURSE ONLY (OUTPATIENT)
Dept: INTERNAL MEDICINE CLINIC | Facility: CLINIC | Age: 79
End: 2024-03-14

## 2024-03-14 DIAGNOSIS — R73.01 IMPAIRED FASTING BLOOD SUGAR: ICD-10-CM

## 2024-03-14 DIAGNOSIS — E55.9 VITAMIN D DEFICIENCY: ICD-10-CM

## 2024-03-14 DIAGNOSIS — E78.00 PURE HYPERCHOLESTEROLEMIA: ICD-10-CM

## 2024-03-14 DIAGNOSIS — E03.9 PRIMARY HYPOTHYROIDISM: ICD-10-CM

## 2024-03-14 LAB
ALBUMIN SERPL-MCNC: 3.8 G/DL (ref 3.2–4.6)
ALBUMIN/GLOB SERPL: 1.3 (ref 0.4–1.6)
ALP SERPL-CCNC: 151 U/L (ref 50–136)
ALT SERPL-CCNC: 21 U/L (ref 12–65)
ANION GAP SERPL CALC-SCNC: 5 MMOL/L (ref 2–11)
AST SERPL-CCNC: 15 U/L (ref 15–37)
BILIRUB SERPL-MCNC: 0.6 MG/DL (ref 0.2–1.1)
BUN SERPL-MCNC: 21 MG/DL (ref 8–23)
CALCIUM SERPL-MCNC: 10.1 MG/DL (ref 8.3–10.4)
CHLORIDE SERPL-SCNC: 109 MMOL/L (ref 103–113)
CHOLEST SERPL-MCNC: 189 MG/DL
CO2 SERPL-SCNC: 30 MMOL/L (ref 21–32)
CREAT SERPL-MCNC: 0.7 MG/DL (ref 0.6–1)
ERYTHROCYTE [DISTWIDTH] IN BLOOD BY AUTOMATED COUNT: 13.7 % (ref 11.9–14.6)
GLOBULIN SER CALC-MCNC: 3 G/DL (ref 2.8–4.5)
GLUCOSE SERPL-MCNC: 93 MG/DL (ref 65–100)
HCT VFR BLD AUTO: 44.8 % (ref 35.8–46.3)
HDLC SERPL-MCNC: 45 MG/DL (ref 40–60)
HDLC SERPL: 4.2
HGB BLD-MCNC: 13.6 G/DL (ref 11.7–15.4)
LDLC SERPL CALC-MCNC: 109 MG/DL
MCH RBC QN AUTO: 28.7 PG (ref 26.1–32.9)
MCHC RBC AUTO-ENTMCNC: 30.4 G/DL (ref 31.4–35)
MCV RBC AUTO: 94.5 FL (ref 82–102)
NRBC # BLD: 0 K/UL (ref 0–0.2)
PLATELET # BLD AUTO: 260 K/UL (ref 150–450)
PMV BLD AUTO: 10.3 FL (ref 9.4–12.3)
POTASSIUM SERPL-SCNC: 4.2 MMOL/L (ref 3.5–5.1)
PROT SERPL-MCNC: 6.8 G/DL (ref 6.3–8.2)
RBC # BLD AUTO: 4.74 M/UL (ref 4.05–5.2)
SODIUM SERPL-SCNC: 144 MMOL/L (ref 136–146)
TRIGL SERPL-MCNC: 175 MG/DL (ref 35–150)
TSH, 3RD GENERATION: 2.06 UIU/ML (ref 0.36–3.74)
VLDLC SERPL CALC-MCNC: 35 MG/DL (ref 6–23)
WBC # BLD AUTO: 6.8 K/UL (ref 4.3–11.1)

## 2024-03-15 LAB
25(OH)D3 SERPL-MCNC: 89.8 NG/ML (ref 30–100)
EST. AVERAGE GLUCOSE BLD GHB EST-MCNC: 114 MG/DL
HBA1C MFR BLD: 5.6 % (ref 4.8–5.6)

## 2024-03-19 NOTE — PROGRESS NOTES
8. Primary insomnia  mirtazapine (REMERON) 7.5 MG tablet      9. Yeast vaginitis  fluconazole (DIFLUCAN) 150 MG tablet        Data reviewed:  Previous notes, labs and Specialists notes, if any, reviewed and discussed with patient.  Vitamin D levels are adequate.  Hypothyroidism.  Continue current dose of thyroid medicine.  Keep appointment with endocrinology.  IFG.  A1c is slightly better.  Reinforced lifestyle changes.  Hyperlipidemia.  Continue statin.  GERD.  Controlled on meds.  Hypertension.  Her blood pressure is good today.  Depression with anxiety.  Continue citalopram.  Insomnia.  Advised that her new doctor will not give her controlled meds so we will try her on mirtazapine.  There is some interaction with citalopram and mirtazapine we will monitor for now.  He has vaginitis likely secondary to antibiotic use.  Prescription for Diflucan sent to pharmacy.  Follow-up will be in about 6 months for her wellness visit with labs prior.  Opportunity to ask questions was offered and were answered to the best of my ability.  Call, send [employer] message or RTC if with questions or concerns        Over 50% of today's office visit was spent in face to face time reviewing test results, prognosis, importance of compliance, education about disease process, benefits of medications, instructions for management of disease and follow up plans.  Total face to face time spent with patient was at least 25 minutes      There are no Patient Instructions on file for this visit.    No follow-up provider specified.    Jeannette Hendricks MD

## 2024-03-21 ENCOUNTER — OFFICE VISIT (OUTPATIENT)
Dept: INTERNAL MEDICINE CLINIC | Facility: CLINIC | Age: 79
End: 2024-03-21

## 2024-03-21 VITALS
SYSTOLIC BLOOD PRESSURE: 130 MMHG | HEART RATE: 82 BPM | OXYGEN SATURATION: 98 % | RESPIRATION RATE: 16 BRPM | TEMPERATURE: 97.7 F | DIASTOLIC BLOOD PRESSURE: 76 MMHG | HEIGHT: 64 IN | BODY MASS INDEX: 30.9 KG/M2 | WEIGHT: 181 LBS

## 2024-03-21 DIAGNOSIS — E03.9 PRIMARY HYPOTHYROIDISM: ICD-10-CM

## 2024-03-21 DIAGNOSIS — F51.01 PRIMARY INSOMNIA: ICD-10-CM

## 2024-03-21 DIAGNOSIS — R73.01 IMPAIRED FASTING BLOOD SUGAR: ICD-10-CM

## 2024-03-21 DIAGNOSIS — E55.9 VITAMIN D DEFICIENCY: Primary | ICD-10-CM

## 2024-03-21 DIAGNOSIS — F41.8 DEPRESSION WITH ANXIETY: ICD-10-CM

## 2024-03-21 DIAGNOSIS — B37.31 YEAST VAGINITIS: ICD-10-CM

## 2024-03-21 DIAGNOSIS — K21.9 GASTROESOPHAGEAL REFLUX DISEASE, UNSPECIFIED WHETHER ESOPHAGITIS PRESENT: ICD-10-CM

## 2024-03-21 DIAGNOSIS — I10 ESSENTIAL HYPERTENSION: ICD-10-CM

## 2024-03-21 DIAGNOSIS — E78.00 PURE HYPERCHOLESTEROLEMIA: ICD-10-CM

## 2024-03-21 RX ORDER — MELOXICAM 15 MG/1
15 TABLET ORAL DAILY
COMMUNITY
Start: 2024-03-04

## 2024-03-21 RX ORDER — CEFDINIR 300 MG/1
300 CAPSULE ORAL 2 TIMES DAILY
COMMUNITY
Start: 2024-03-18 | End: 2024-03-28

## 2024-03-21 RX ORDER — DICYCLOMINE HCL 20 MG
20 TABLET ORAL EVERY 6 HOURS PRN
Qty: 120 TABLET | Refills: 3 | Status: SHIPPED | OUTPATIENT
Start: 2024-03-21

## 2024-03-21 RX ORDER — CITALOPRAM 20 MG/1
20 TABLET ORAL DAILY
Qty: 90 TABLET | Refills: 3 | Status: SHIPPED | OUTPATIENT
Start: 2024-03-21

## 2024-03-21 RX ORDER — MIRTAZAPINE 7.5 MG/1
7.5 TABLET, FILM COATED ORAL NIGHTLY
Qty: 30 TABLET | Refills: 0 | Status: SHIPPED | OUTPATIENT
Start: 2024-03-21

## 2024-03-21 RX ORDER — FLUCONAZOLE 150 MG/1
150 TABLET ORAL ONCE
Qty: 1 TABLET | Refills: 0 | Status: SHIPPED | OUTPATIENT
Start: 2024-03-21 | End: 2024-03-21

## 2024-03-21 RX ORDER — MIRTAZAPINE 7.5 MG/1
7.5 TABLET, FILM COATED ORAL NIGHTLY
Qty: 30 TABLET | Refills: 5 | Status: CANCELLED | OUTPATIENT
Start: 2024-03-21

## 2024-03-21 SDOH — ECONOMIC STABILITY: INCOME INSECURITY: HOW HARD IS IT FOR YOU TO PAY FOR THE VERY BASICS LIKE FOOD, HOUSING, MEDICAL CARE, AND HEATING?: NOT HARD AT ALL

## 2024-03-21 SDOH — ECONOMIC STABILITY: FOOD INSECURITY: WITHIN THE PAST 12 MONTHS, YOU WORRIED THAT YOUR FOOD WOULD RUN OUT BEFORE YOU GOT MONEY TO BUY MORE.: NEVER TRUE

## 2024-03-21 SDOH — ECONOMIC STABILITY: FOOD INSECURITY: WITHIN THE PAST 12 MONTHS, THE FOOD YOU BOUGHT JUST DIDN'T LAST AND YOU DIDN'T HAVE MONEY TO GET MORE.: NEVER TRUE

## 2024-03-21 ASSESSMENT — PATIENT HEALTH QUESTIONNAIRE - PHQ9
8. MOVING OR SPEAKING SO SLOWLY THAT OTHER PEOPLE COULD HAVE NOTICED. OR THE OPPOSITE, BEING SO FIGETY OR RESTLESS THAT YOU HAVE BEEN MOVING AROUND A LOT MORE THAN USUAL: NOT AT ALL
6. FEELING BAD ABOUT YOURSELF - OR THAT YOU ARE A FAILURE OR HAVE LET YOURSELF OR YOUR FAMILY DOWN: NOT AT ALL
10. IF YOU CHECKED OFF ANY PROBLEMS, HOW DIFFICULT HAVE THESE PROBLEMS MADE IT FOR YOU TO DO YOUR WORK, TAKE CARE OF THINGS AT HOME, OR GET ALONG WITH OTHER PEOPLE: NOT DIFFICULT AT ALL
SUM OF ALL RESPONSES TO PHQ QUESTIONS 1-9: 0
2. FEELING DOWN, DEPRESSED OR HOPELESS: NOT AT ALL
SUM OF ALL RESPONSES TO PHQ QUESTIONS 1-9: 0
7. TROUBLE CONCENTRATING ON THINGS, SUCH AS READING THE NEWSPAPER OR WATCHING TELEVISION: NOT AT ALL
3. TROUBLE FALLING OR STAYING ASLEEP: NOT AT ALL
5. POOR APPETITE OR OVEREATING: NOT AT ALL
SUM OF ALL RESPONSES TO PHQ QUESTIONS 1-9: 0
4. FEELING TIRED OR HAVING LITTLE ENERGY: NOT AT ALL
SUM OF ALL RESPONSES TO PHQ9 QUESTIONS 1 & 2: 0
SUM OF ALL RESPONSES TO PHQ QUESTIONS 1-9: 0
1. LITTLE INTEREST OR PLEASURE IN DOING THINGS: NOT AT ALL
DEPRESSION UNABLE TO ASSESS: FUNCTIONAL CAPACITY MOTIVATION LIMITS ACCURACY

## 2024-03-21 ASSESSMENT — ANXIETY QUESTIONNAIRES
IF YOU CHECKED OFF ANY PROBLEMS ON THIS QUESTIONNAIRE, HOW DIFFICULT HAVE THESE PROBLEMS MADE IT FOR YOU TO DO YOUR WORK, TAKE CARE OF THINGS AT HOME, OR GET ALONG WITH OTHER PEOPLE: NOT DIFFICULT AT ALL
3. WORRYING TOO MUCH ABOUT DIFFERENT THINGS: NOT AT ALL
7. FEELING AFRAID AS IF SOMETHING AWFUL MIGHT HAPPEN: NOT AT ALL
4. TROUBLE RELAXING: NOT AT ALL
2. NOT BEING ABLE TO STOP OR CONTROL WORRYING: NOT AT ALL
1. FEELING NERVOUS, ANXIOUS, OR ON EDGE: NOT AT ALL
GAD7 TOTAL SCORE: 0
5. BEING SO RESTLESS THAT IT IS HARD TO SIT STILL: NOT AT ALL
6. BECOMING EASILY ANNOYED OR IRRITABLE: NOT AT ALL

## 2024-03-21 ASSESSMENT — ENCOUNTER SYMPTOMS
SHORTNESS OF BREATH: 0
BLOOD IN STOOL: 0

## 2024-03-26 ENCOUNTER — TELEPHONE (OUTPATIENT)
Dept: INTERNAL MEDICINE CLINIC | Facility: CLINIC | Age: 79
End: 2024-03-26

## 2024-03-26 DIAGNOSIS — F51.01 PRIMARY INSOMNIA: ICD-10-CM

## 2024-03-26 DIAGNOSIS — F41.8 DEPRESSION WITH ANXIETY: Primary | ICD-10-CM

## 2024-03-26 NOTE — TELEPHONE ENCOUNTER
Returned call to pt, she stated that she took Mirtazapine for 3 nights, it caused her to feel restless and it was making her seeing things. She d/c medication.   Pt will like to know if there is anything else that can be prescribe for her, she stated that she was taking Xanax for over 30 yrs and it worked great.     Please advise  Than you!

## 2024-03-26 NOTE — TELEPHONE ENCOUNTER
Pt states that the following Rx mirtazapine  has been keeping her up all night with side effects of seeing things. Pt stated that she has stopped taking this medication and would like the clinical team to call her.

## 2024-03-27 RX ORDER — ESZOPICLONE 2 MG/1
2 TABLET, FILM COATED ORAL NIGHTLY
Qty: 30 TABLET | Refills: 1 | Status: SHIPPED | OUTPATIENT
Start: 2024-03-27 | End: 2025-03-27

## 2024-03-27 NOTE — TELEPHONE ENCOUNTER
Insomnia - on Celexa, did not tolerate mirtazapine; was on xanax for a long time which worked.   Will try her on Lunesta - aware that this is also a controlled med.  Referral to Psych done for management of depression with anxiety.

## 2024-04-15 ENCOUNTER — TELEPHONE (OUTPATIENT)
Dept: FAMILY MEDICINE CLINIC | Facility: CLINIC | Age: 79
End: 2024-04-15

## 2024-05-15 ENCOUNTER — OFFICE VISIT (OUTPATIENT)
Age: 79
End: 2024-05-15
Payer: MEDICARE

## 2024-05-15 VITALS
BODY MASS INDEX: 31.24 KG/M2 | DIASTOLIC BLOOD PRESSURE: 82 MMHG | SYSTOLIC BLOOD PRESSURE: 130 MMHG | WEIGHT: 183 LBS | HEART RATE: 88 BPM | HEIGHT: 64 IN

## 2024-05-15 DIAGNOSIS — I10 ESSENTIAL HYPERTENSION: Primary | ICD-10-CM

## 2024-05-15 PROCEDURE — G8427 DOCREV CUR MEDS BY ELIG CLIN: HCPCS | Performed by: INTERNAL MEDICINE

## 2024-05-15 PROCEDURE — G8417 CALC BMI ABV UP PARAM F/U: HCPCS | Performed by: INTERNAL MEDICINE

## 2024-05-15 PROCEDURE — 1123F ACP DISCUSS/DSCN MKR DOCD: CPT | Performed by: INTERNAL MEDICINE

## 2024-05-15 PROCEDURE — G8399 PT W/DXA RESULTS DOCUMENT: HCPCS | Performed by: INTERNAL MEDICINE

## 2024-05-15 PROCEDURE — 1036F TOBACCO NON-USER: CPT | Performed by: INTERNAL MEDICINE

## 2024-05-15 PROCEDURE — 93000 ELECTROCARDIOGRAM COMPLETE: CPT | Performed by: INTERNAL MEDICINE

## 2024-05-15 PROCEDURE — 1090F PRES/ABSN URINE INCON ASSESS: CPT | Performed by: INTERNAL MEDICINE

## 2024-05-15 PROCEDURE — 3079F DIAST BP 80-89 MM HG: CPT | Performed by: INTERNAL MEDICINE

## 2024-05-15 PROCEDURE — 99204 OFFICE O/P NEW MOD 45 MIN: CPT | Performed by: INTERNAL MEDICINE

## 2024-05-15 PROCEDURE — 3075F SYST BP GE 130 - 139MM HG: CPT | Performed by: INTERNAL MEDICINE

## 2024-05-15 RX ORDER — HYDROCHLOROTHIAZIDE 25 MG/1
25 TABLET ORAL PRN
COMMUNITY

## 2024-05-22 ASSESSMENT — ENCOUNTER SYMPTOMS
ABDOMINAL PAIN: 0
SHORTNESS OF BREATH: 0

## 2024-05-22 NOTE — PROGRESS NOTES
CHOLHDLRATIO 4.2 02/01/2023       EKG  Sinus rhythm      OUTSIDE RECORDS REVIEW    Records from outside providers have been reviewed and summarized as noted in the HPI, past history and data review sections of this note, and reviewed with patient. .       ASSESSMENT and PLAN    Tyra was seen today for new patient, hypertension, chest pain and edema.    Diagnoses and all orders for this visit:    Essential hypertension  -     EKG 12 lead          IMPRESSION:    Ms. Phillip presents today for follow-up.  Chest pain is improving.  Sounded noncardiac given his lack of exertional component.  EKG, physical exam are benign.  I recommended no test at this time unless symptoms worsen    No follow-ups on file.            Thank you for allowing me to participate in this patient's care.  Please call or contact me if there are any questions or concerns regarding the above.      Octaviano Chaparro III, MD  05/22/24  2:06 PM

## 2024-05-23 DIAGNOSIS — F51.01 PRIMARY INSOMNIA: ICD-10-CM

## 2024-05-23 RX ORDER — ESZOPICLONE 2 MG/1
2 TABLET, FILM COATED ORAL NIGHTLY
Qty: 30 TABLET | Refills: 1 | Status: SHIPPED | OUTPATIENT
Start: 2024-05-23 | End: 2025-05-23

## 2024-05-23 RX ORDER — ESZOPICLONE 2 MG/1
2 TABLET, FILM COATED ORAL NIGHTLY
Qty: 30 TABLET | Refills: 1 | Status: CANCELLED | OUTPATIENT
Start: 2024-05-23 | End: 2025-05-23

## 2024-05-23 NOTE — TELEPHONE ENCOUNTER
Refill request on:   Lunesta 2 mg - Take 1 tablet by mouth nightly.     LOV- 3/21/24  Next appt - 7/17/24    Rx pended   Thank you!    1. The severity of signs/symptoms.(See ED/admit documents)

## 2024-05-30 DIAGNOSIS — F51.01 PRIMARY INSOMNIA: ICD-10-CM

## 2024-05-31 RX ORDER — ESZOPICLONE 2 MG/1
2 TABLET, FILM COATED ORAL NIGHTLY
Qty: 30 TABLET | Refills: 1 | Status: SHIPPED | OUTPATIENT
Start: 2024-05-31 | End: 2025-05-31

## 2024-07-17 ENCOUNTER — OFFICE VISIT (OUTPATIENT)
Dept: INTERNAL MEDICINE CLINIC | Facility: CLINIC | Age: 79
End: 2024-07-17
Payer: MEDICARE

## 2024-07-17 VITALS
HEART RATE: 96 BPM | TEMPERATURE: 97.2 F | OXYGEN SATURATION: 97 % | HEIGHT: 63 IN | SYSTOLIC BLOOD PRESSURE: 118 MMHG | DIASTOLIC BLOOD PRESSURE: 70 MMHG | WEIGHT: 184 LBS | BODY MASS INDEX: 32.6 KG/M2

## 2024-07-17 DIAGNOSIS — F51.01 PRIMARY INSOMNIA: Primary | ICD-10-CM

## 2024-07-17 DIAGNOSIS — E03.9 PRIMARY HYPOTHYROIDISM: ICD-10-CM

## 2024-07-17 DIAGNOSIS — I10 ESSENTIAL HYPERTENSION: ICD-10-CM

## 2024-07-17 DIAGNOSIS — F41.8 DEPRESSION WITH ANXIETY: ICD-10-CM

## 2024-07-17 PROCEDURE — G8417 CALC BMI ABV UP PARAM F/U: HCPCS | Performed by: FAMILY MEDICINE

## 2024-07-17 PROCEDURE — 1090F PRES/ABSN URINE INCON ASSESS: CPT | Performed by: FAMILY MEDICINE

## 2024-07-17 PROCEDURE — G8399 PT W/DXA RESULTS DOCUMENT: HCPCS | Performed by: FAMILY MEDICINE

## 2024-07-17 PROCEDURE — G2211 COMPLEX E/M VISIT ADD ON: HCPCS | Performed by: FAMILY MEDICINE

## 2024-07-17 PROCEDURE — 1036F TOBACCO NON-USER: CPT | Performed by: FAMILY MEDICINE

## 2024-07-17 PROCEDURE — 3078F DIAST BP <80 MM HG: CPT | Performed by: FAMILY MEDICINE

## 2024-07-17 PROCEDURE — 99214 OFFICE O/P EST MOD 30 MIN: CPT | Performed by: FAMILY MEDICINE

## 2024-07-17 PROCEDURE — 3074F SYST BP LT 130 MM HG: CPT | Performed by: FAMILY MEDICINE

## 2024-07-17 PROCEDURE — G8427 DOCREV CUR MEDS BY ELIG CLIN: HCPCS | Performed by: FAMILY MEDICINE

## 2024-07-17 PROCEDURE — 1123F ACP DISCUSS/DSCN MKR DOCD: CPT | Performed by: FAMILY MEDICINE

## 2024-07-17 RX ORDER — ACETAMINOPHEN 500 MG
500 TABLET ORAL 2 TIMES DAILY PRN
COMMUNITY

## 2024-07-17 RX ORDER — ESZOPICLONE 2 MG/1
2 TABLET, FILM COATED ORAL NIGHTLY
Qty: 30 TABLET | Refills: 2 | Status: SHIPPED | OUTPATIENT
Start: 2024-07-17 | End: 2024-10-15

## 2024-07-17 NOTE — PROGRESS NOTES
Florencia Patterson,   [facility] [place] [address]  Phone [phone]  Fax [phone]      Tyra Phillip (: 1945) is a 78 y.o. female, here for evaluation of the following chief complaint(s):  Established New Doctor (AWV done 23, HX bilateral mastectomy , eye exam every 6 months and last colonoscopy done about yrs ago. ) and Medication Refill       ASSESSMENT/PLAN:  1. Primary insomnia  Overview:  Sleep hygiene techniques suggested. Medication effective and achieving desired therapeutic effect.  Will continue with current dosing. Pt. aware of the appropriate use, risks, benefits of the medication. PDMP reviewed.  Reviewed side effects, interactions, and safety precautions.   Pt warned to only take medication as prescribed as inappropriate use can result in death or permanent disability. Pt also advised not to drive or use alcohol with this medication. They acknowledged understanding of all discussions. PDMP reviewed. No abuse suspected.  Orders:  -     eszopiclone (LUNESTA) 2 MG TABS; Take 1 tablet by mouth nightly for 90 days. Max Daily Amount: 2 mg, Disp-30 tablet, R-2Normal  2. Depression with anxiety  Overview:  Discussed about slowly tapering off of celexa, regimen reviewed with patient.   Denies SI/HI  Reviewed med side effects and safety precautions. Educated on relaxation techniques. Avoid stressful situations. Monitor and report any worsening depressive symptoms. Call suicide hotline for any suicidal thoughts or go directly to the emergency room or call 911.  3. Primary hypothyroidism  Overview:  Tolerating levothyroxine well. Will recheck TSH and make adjustments to medication dosing as indicated.  4. Essential hypertension  Overview:  Tolerating medication well for HTN. Achieving desired therapeutic response with   Hypertension Medications       Thiazides and Thiazide-Like Diuretics       hydroCHLOROthiazide (HYDRODIURIL) 25 MG tablet

## 2024-08-06 DIAGNOSIS — F51.01 PRIMARY INSOMNIA: ICD-10-CM

## 2024-08-06 RX ORDER — ESZOPICLONE 2 MG/1
2 TABLET, FILM COATED ORAL NIGHTLY
Qty: 30 TABLET | Refills: 2 | Status: CANCELLED | OUTPATIENT
Start: 2024-08-06 | End: 2024-11-04

## 2024-08-20 NOTE — TELEPHONE ENCOUNTER
Refill request on:  eszopiclone (LUNESTA) 2 MG - Take 1 tablet by mouth nightly for 90 days. Max Daily Amount: 2 mg     LOV - 7/17/24  Next Appt- 10/17/24    Rx pended   Thank you!     I see a referral to  is it to take over medication for insomnia too? Please advise

## 2024-09-19 ENCOUNTER — OFFICE VISIT (OUTPATIENT)
Age: 79
End: 2024-09-19
Payer: MEDICARE

## 2024-09-19 VITALS
HEART RATE: 99 BPM | HEIGHT: 65 IN | DIASTOLIC BLOOD PRESSURE: 94 MMHG | SYSTOLIC BLOOD PRESSURE: 138 MMHG | BODY MASS INDEX: 30.32 KG/M2 | WEIGHT: 182 LBS

## 2024-09-19 DIAGNOSIS — I10 ESSENTIAL HYPERTENSION: Primary | ICD-10-CM

## 2024-09-19 DIAGNOSIS — I49.9 IRREGULAR HEART RATE: ICD-10-CM

## 2024-09-19 DIAGNOSIS — R06.09 DOE (DYSPNEA ON EXERTION): ICD-10-CM

## 2024-09-19 PROCEDURE — 3080F DIAST BP >= 90 MM HG: CPT | Performed by: INTERNAL MEDICINE

## 2024-09-19 PROCEDURE — G8427 DOCREV CUR MEDS BY ELIG CLIN: HCPCS | Performed by: INTERNAL MEDICINE

## 2024-09-19 PROCEDURE — 93000 ELECTROCARDIOGRAM COMPLETE: CPT | Performed by: INTERNAL MEDICINE

## 2024-09-19 PROCEDURE — 3075F SYST BP GE 130 - 139MM HG: CPT | Performed by: INTERNAL MEDICINE

## 2024-09-19 PROCEDURE — 1123F ACP DISCUSS/DSCN MKR DOCD: CPT | Performed by: INTERNAL MEDICINE

## 2024-09-19 PROCEDURE — G8399 PT W/DXA RESULTS DOCUMENT: HCPCS | Performed by: INTERNAL MEDICINE

## 2024-09-19 PROCEDURE — 99214 OFFICE O/P EST MOD 30 MIN: CPT | Performed by: INTERNAL MEDICINE

## 2024-09-19 PROCEDURE — 1036F TOBACCO NON-USER: CPT | Performed by: INTERNAL MEDICINE

## 2024-09-19 PROCEDURE — G8417 CALC BMI ABV UP PARAM F/U: HCPCS | Performed by: INTERNAL MEDICINE

## 2024-09-19 PROCEDURE — 1090F PRES/ABSN URINE INCON ASSESS: CPT | Performed by: INTERNAL MEDICINE

## 2024-10-10 ENCOUNTER — LAB (OUTPATIENT)
Dept: INTERNAL MEDICINE CLINIC | Facility: CLINIC | Age: 79
End: 2024-10-10

## 2024-10-10 DIAGNOSIS — E55.9 VITAMIN D DEFICIENCY: ICD-10-CM

## 2024-10-10 DIAGNOSIS — R73.01 IMPAIRED FASTING BLOOD SUGAR: ICD-10-CM

## 2024-10-10 DIAGNOSIS — E78.00 PURE HYPERCHOLESTEROLEMIA: ICD-10-CM

## 2024-10-10 DIAGNOSIS — E03.9 PRIMARY HYPOTHYROIDISM: ICD-10-CM

## 2024-10-10 LAB
25(OH)D3 SERPL-MCNC: 88.1 NG/ML (ref 30–100)
ALBUMIN SERPL-MCNC: 3.8 G/DL (ref 3.2–4.6)
ALBUMIN/GLOB SERPL: 1.3 (ref 1–1.9)
ALP SERPL-CCNC: 147 U/L (ref 35–104)
ALT SERPL-CCNC: 14 U/L (ref 8–45)
ANION GAP SERPL CALC-SCNC: 11 MMOL/L (ref 9–18)
AST SERPL-CCNC: 16 U/L (ref 15–37)
BILIRUB SERPL-MCNC: 0.5 MG/DL (ref 0–1.2)
BUN SERPL-MCNC: 19 MG/DL (ref 8–23)
CALCIUM SERPL-MCNC: 9.9 MG/DL (ref 8.8–10.2)
CHLORIDE SERPL-SCNC: 104 MMOL/L (ref 98–107)
CHOLEST SERPL-MCNC: 172 MG/DL (ref 0–200)
CO2 SERPL-SCNC: 30 MMOL/L (ref 20–28)
CREAT SERPL-MCNC: 0.87 MG/DL (ref 0.6–1.1)
ERYTHROCYTE [DISTWIDTH] IN BLOOD BY AUTOMATED COUNT: 13.3 % (ref 11.9–14.6)
EST. AVERAGE GLUCOSE BLD GHB EST-MCNC: 124 MG/DL
GLOBULIN SER CALC-MCNC: 2.9 G/DL (ref 2.3–3.5)
GLUCOSE SERPL-MCNC: 106 MG/DL (ref 70–99)
HBA1C MFR BLD: 6 % (ref 0–5.6)
HCT VFR BLD AUTO: 45.5 % (ref 35.8–46.3)
HDLC SERPL-MCNC: 41 MG/DL (ref 40–60)
HDLC SERPL: 4.2 (ref 0–5)
HGB BLD-MCNC: 14.2 G/DL (ref 11.7–15.4)
LDLC SERPL CALC-MCNC: 93 MG/DL (ref 0–100)
MCH RBC QN AUTO: 29.5 PG (ref 26.1–32.9)
MCHC RBC AUTO-ENTMCNC: 31.2 G/DL (ref 31.4–35)
MCV RBC AUTO: 94.6 FL (ref 82–102)
NRBC # BLD: 0 K/UL (ref 0–0.2)
PLATELET # BLD AUTO: 231 K/UL (ref 150–450)
PMV BLD AUTO: 10.2 FL (ref 9.4–12.3)
POTASSIUM SERPL-SCNC: 4.5 MMOL/L (ref 3.5–5.1)
PROT SERPL-MCNC: 6.6 G/DL (ref 6.3–8.2)
RBC # BLD AUTO: 4.81 M/UL (ref 4.05–5.2)
SODIUM SERPL-SCNC: 145 MMOL/L (ref 136–145)
TRIGL SERPL-MCNC: 194 MG/DL (ref 0–150)
TSH, 3RD GENERATION: 2.8 UIU/ML (ref 0.27–4.2)
VLDLC SERPL CALC-MCNC: 39 MG/DL (ref 6–23)
WBC # BLD AUTO: 6.6 K/UL (ref 4.3–11.1)

## 2024-10-15 ASSESSMENT — ENCOUNTER SYMPTOMS
ABDOMINAL PAIN: 0
SHORTNESS OF BREATH: 0

## 2024-10-15 NOTE — PROGRESS NOTES
[facility] CARDIOLOGY  [address] [place] [address], [address]  [place] [address]  PHONE: [phone]      10/15/24    NAME:  Tyra Phillip  : 1945  MRN: [id number]         SUBJECTIVE:   Tyra Phillip is a 78 y.o. female seen for a follow up visit regarding the following:     Chief Complaint   Patient presents with    Hypertension            HPI:  Follow up  Hypertension   .    Ms. Phillip presents today for follow-up.  She is complaining of some chest and arm pain for the last several weeks.  Happens both with rest and exertion.  Blood pressure little elevated today at 138/94, predominantly been well-controlled.  Current medications include hydrochlorothiazide metoprolol, atorvastatin and aspirin.  No recent hospitalizations.    Hypertension  Pertinent negatives include no shortness of breath.           Cardiac Medications       Thiazides and Thiazide-Like Diuretics       hydroCHLOROthiazide (HYDRODIURIL) 25 MG tablet Take 1 tablet by mouth as needed       Beta Blockers Cardio-Selective       metoprolol succinate (TOPROL XL) 100 MG extended release tablet Take 1 tablet by mouth daily       HMG CoA Reductase Inhibitors       atorvastatin (LIPITOR) 20 MG tablet Take 1 tablet by mouth daily       Salicylates       aspirin 81 MG EC tablet Take 1 tablet by mouth daily                  Past Medical History, Past Surgical History, Family history, Social History, and Medications were all reviewed with the patient today and updated as necessary.     Prior to Admission medications    Medication Sig Start Date End Date Taking? Authorizing Provider   Multiple Vitamins-Minerals (PRESERVISION AREDS PO) Take 1 tablet by mouth in the morning and at bedtime   Yes ProviderAlban MD   acetaminophen (TYLENOL) 500 MG tablet Take 1 tablet by mouth 2 times daily as needed for Pain   Yes ProviderAlban MD   Probiotic Product (PROBIOTIC ADVANCED PO) Take 1 tablet by mouth nightly   Yes Provider

## 2024-10-17 ENCOUNTER — OFFICE VISIT (OUTPATIENT)
Dept: INTERNAL MEDICINE CLINIC | Facility: CLINIC | Age: 79
End: 2024-10-17

## 2024-10-17 VITALS
TEMPERATURE: 97.3 F | HEIGHT: 62 IN | DIASTOLIC BLOOD PRESSURE: 69 MMHG | WEIGHT: 179 LBS | HEART RATE: 98 BPM | SYSTOLIC BLOOD PRESSURE: 119 MMHG | BODY MASS INDEX: 32.94 KG/M2 | OXYGEN SATURATION: 97 %

## 2024-10-17 DIAGNOSIS — E03.9 PRIMARY HYPOTHYROIDISM: ICD-10-CM

## 2024-10-17 DIAGNOSIS — R73.03 PREDIABETES: ICD-10-CM

## 2024-10-17 DIAGNOSIS — F51.01 PRIMARY INSOMNIA: ICD-10-CM

## 2024-10-17 DIAGNOSIS — Z00.00 MEDICARE ANNUAL WELLNESS VISIT, SUBSEQUENT: Primary | ICD-10-CM

## 2024-10-17 DIAGNOSIS — F41.8 DEPRESSION WITH ANXIETY: ICD-10-CM

## 2024-10-17 DIAGNOSIS — I10 ESSENTIAL HYPERTENSION: ICD-10-CM

## 2024-10-17 RX ORDER — TEMAZEPAM 15 MG/1
15 CAPSULE ORAL NIGHTLY PRN
Qty: 30 CAPSULE | Refills: 0 | Status: SHIPPED | OUTPATIENT
Start: 2024-10-17 | End: 2024-11-16

## 2024-10-17 RX ORDER — ESZOPICLONE 2 MG/1
2 TABLET, FILM COATED ORAL NIGHTLY
COMMUNITY

## 2024-10-17 RX ORDER — HYDROXYZINE PAMOATE 25 MG/1
25 CAPSULE ORAL 3 TIMES DAILY PRN
Qty: 30 CAPSULE | Refills: 1 | Status: SHIPPED | OUTPATIENT
Start: 2024-10-17

## 2024-10-17 RX ORDER — CITALOPRAM HYDROBROMIDE 20 MG/1
20 TABLET ORAL DAILY
Qty: 90 TABLET | Refills: 3 | Status: SHIPPED | OUTPATIENT
Start: 2024-10-17

## 2024-10-17 ASSESSMENT — PATIENT HEALTH QUESTIONNAIRE - PHQ9
1. LITTLE INTEREST OR PLEASURE IN DOING THINGS: NOT AT ALL
4. FEELING TIRED OR HAVING LITTLE ENERGY: NEARLY EVERY DAY
10. IF YOU CHECKED OFF ANY PROBLEMS, HOW DIFFICULT HAVE THESE PROBLEMS MADE IT FOR YOU TO DO YOUR WORK, TAKE CARE OF THINGS AT HOME, OR GET ALONG WITH OTHER PEOPLE: NOT DIFFICULT AT ALL
2. FEELING DOWN, DEPRESSED OR HOPELESS: NOT AT ALL
9. THOUGHTS THAT YOU WOULD BE BETTER OFF DEAD, OR OF HURTING YOURSELF: NOT AT ALL
5. POOR APPETITE OR OVEREATING: NOT AT ALL
SUM OF ALL RESPONSES TO PHQ QUESTIONS 1-9: 6
6. FEELING BAD ABOUT YOURSELF - OR THAT YOU ARE A FAILURE OR HAVE LET YOURSELF OR YOUR FAMILY DOWN: NOT AT ALL
3. TROUBLE FALLING OR STAYING ASLEEP: NEARLY EVERY DAY
SUM OF ALL RESPONSES TO PHQ9 QUESTIONS 1 & 2: 0
8. MOVING OR SPEAKING SO SLOWLY THAT OTHER PEOPLE COULD HAVE NOTICED. OR THE OPPOSITE, BEING SO FIGETY OR RESTLESS THAT YOU HAVE BEEN MOVING AROUND A LOT MORE THAN USUAL: NOT AT ALL
SUM OF ALL RESPONSES TO PHQ QUESTIONS 1-9: 6
7. TROUBLE CONCENTRATING ON THINGS, SUCH AS READING THE NEWSPAPER OR WATCHING TELEVISION: NOT AT ALL

## 2024-10-17 ASSESSMENT — LIFESTYLE VARIABLES
HOW MANY STANDARD DRINKS CONTAINING ALCOHOL DO YOU HAVE ON A TYPICAL DAY: PATIENT DOES NOT DRINK
HOW OFTEN DO YOU HAVE A DRINK CONTAINING ALCOHOL: NEVER

## 2024-10-17 NOTE — PROGRESS NOTES
Medicare Annual Wellness Visit    Tyra Phillip is here for Follow-up (Insomnia and migraines ) and Medicare AWV    Assessment & Plan   1. Medicare annual wellness visit, subsequent  2. Essential hypertension  Overview:  Tolerating medication well for HTN. Achieving desired therapeutic response with   Hypertension Medications       Thiazides and Thiazide-Like Diuretics       hydroCHLOROthiazide (HYDRODIURIL) 25 MG tablet Take 1 tablet by mouth as needed       Beta Blockers Cardio-Selective       metoprolol succinate (TOPROL XL) 100 MG extended release tablet Take 1 tablet by mouth daily         --will continue. Will periodically review and adjust if needed.  Encourage home monitoring.   3. Primary hypothyroidism  Overview:  Tolerating levothyroxine well. Will recheck TSH and make adjustments to medication dosing as indicated.  4. Depression with anxiety  Overview:  Restarted on Celexa  Denies SI/HI  Reviewed med side effects and safety precautions. Educated on relaxation techniques. Avoid stressful situations. Monitor and report any worsening depressive symptoms. Call suicide hotline for any suicidal thoughts or go directly to the emergency room or call 911.  Refer to psychiatry for further workup and management  Orders:  -     citalopram (CELEXA) 20 MG tablet; Take 1 tablet by mouth daily, Disp-90 tablet, R-3Normal  -     [facility] - Freeman Donato MD, Psychiatry, [place]  -     temazepam (RESTORIL) 15 MG capsule; Take 1 capsule by mouth nightly as needed for Sleep or Anxiety for up to 30 days. Max Daily Amount: 15 mg, Disp-30 capsule, R-0Normal  5. Primary insomnia  Overview:  Sleep hygiene techniques suggested.   Stopped Lunesta due to side effects  Started on temazepam  Hydroxyzine as needed  Reviewed side effects, interactions, and safety precautions.   Pt warned to only take medication as prescribed as inappropriate use can result in death or permanent disability. Pt also advised not to drive or use

## 2024-10-17 NOTE — PATIENT INSTRUCTIONS
adult-strength or 2 to 4 low-dose aspirin. Wait for an ambulance. Do not try to drive yourself.  Watch closely for changes in your health, and be sure to contact your doctor if you have any problems.  Where can you learn more?  Go to https://www.[employer].net/patientEd and enter F075 to learn more about \"A Healthy Heart: Care Instructions.\"  Current as of: June 24, 2023  Content Version: 14.2  © 2024 [employer].   Care instructions adapted under license by [employer]. If you have questions about a medical condition or this instruction, always ask your healthcare professional. [employer] disclaims any warranty or liability for your use of this information.      Personalized Preventive Plan for Tyra Phillip - 10/17/2024  Medicare offers a range of preventive health benefits. Some of the tests and screenings are paid in full while other may be subject to a deductible, co-insurance, and/or copay.    Some of these benefits include a comprehensive review of your medical history including lifestyle, illnesses that may run in your family, and various assessments and screenings as appropriate.    After reviewing your medical record and screening and assessments performed today your provider may have ordered immunizations, labs, imaging, and/or referrals for you.  A list of these orders (if applicable) as well as your Preventive Care list are included within your After Visit Summary for your review.    Other Preventive Recommendations:    A preventive eye exam performed by an eye specialist is recommended every 1-2 years to screen for glaucoma; cataracts, macular degeneration, and other eye disorders.  A preventive dental visit is recommended every 6 months.  Try to get at least 150 minutes of exercise per week or 10,000 steps per day on a pedometer .  Order or download the FREE \"Exercise & Physical Activity: Your Everyday Guide\" from [employer]. Call 1-656.666.3374 or

## 2024-10-29 PROBLEM — R73.03 PREDIABETES: Status: ACTIVE | Noted: 2024-10-29

## 2024-11-12 DIAGNOSIS — F41.8 DEPRESSION WITH ANXIETY: ICD-10-CM

## 2024-11-12 DIAGNOSIS — F51.01 PRIMARY INSOMNIA: ICD-10-CM

## 2024-11-12 RX ORDER — TEMAZEPAM 15 MG/1
15 CAPSULE ORAL NIGHTLY PRN
Qty: 30 CAPSULE | Refills: 0 | Status: CANCELLED | OUTPATIENT
Start: 2024-11-12 | End: 2024-12-12

## 2024-11-13 NOTE — TELEPHONE ENCOUNTER
Refill request on:  temazepam (RESTORIL) 15 MG - Take 1 capsule by mouth nightly as needed for Sleep or Anxiety for up to 30 days    LOV - 10/17/24  Next appt - 1/17/25    Rx pended   Thank you!

## 2024-11-18 NOTE — TELEPHONE ENCOUNTER
Spoke to pt, she did received a call from behavioral health however she misunderstood and thought  that  because she was given a different medication, she will not need psychiatry at this time.     Patient will call  them back to schedule an appointment

## 2024-11-18 NOTE — TELEPHONE ENCOUNTER
Yes, definitely needs to see the psychiatrist. But find out from patient did the medication help with sleep? Any problems or side effects?

## 2024-11-18 NOTE — TELEPHONE ENCOUNTER
Patient was supposed to see psychiatrist as referred and discussed in office. Please advise patient to see psychiatrist as referred, given the phone number to contact. Thank you. Find out and let me know.

## 2024-11-21 ENCOUNTER — OFFICE VISIT (OUTPATIENT)
Dept: BEHAVIORAL/MENTAL HEALTH CLINIC | Facility: CLINIC | Age: 79
End: 2024-11-21
Payer: MEDICARE

## 2024-11-21 VITALS
WEIGHT: 180.2 LBS | BODY MASS INDEX: 33.16 KG/M2 | SYSTOLIC BLOOD PRESSURE: 138 MMHG | HEART RATE: 96 BPM | DIASTOLIC BLOOD PRESSURE: 88 MMHG | HEIGHT: 62 IN | OXYGEN SATURATION: 98 %

## 2024-11-21 DIAGNOSIS — F33.0 MILD EPISODE OF RECURRENT MAJOR DEPRESSIVE DISORDER (HCC): Primary | ICD-10-CM

## 2024-11-21 DIAGNOSIS — F41.9 ANXIETY DISORDER, UNSPECIFIED TYPE: ICD-10-CM

## 2024-11-21 DIAGNOSIS — G47.00 INSOMNIA, UNSPECIFIED TYPE: ICD-10-CM

## 2024-11-21 PROBLEM — F51.01 PRIMARY INSOMNIA: Status: RESOLVED | Noted: 2024-07-17 | Resolved: 2024-11-21

## 2024-11-21 PROCEDURE — 90792 PSYCH DIAG EVAL W/MED SRVCS: CPT | Performed by: PSYCHIATRY & NEUROLOGY

## 2024-11-21 PROCEDURE — 1036F TOBACCO NON-USER: CPT | Performed by: PSYCHIATRY & NEUROLOGY

## 2024-11-21 RX ORDER — ALPRAZOLAM 1 MG/1
.5-1 TABLET ORAL NIGHTLY PRN
Qty: 30 TABLET | Refills: 1 | Status: SHIPPED | OUTPATIENT
Start: 2024-11-21 | End: 2025-01-20

## 2024-11-21 RX ORDER — TEMAZEPAM 15 MG/1
15 CAPSULE ORAL NIGHTLY PRN
COMMUNITY
End: 2024-11-21 | Stop reason: ALTCHOICE

## 2024-11-21 ASSESSMENT — ANXIETY QUESTIONNAIRES
GAD7 TOTAL SCORE: 4
4. TROUBLE RELAXING: MORE THAN HALF THE DAYS
7. FEELING AFRAID AS IF SOMETHING AWFUL MIGHT HAPPEN: SEVERAL DAYS
6. BECOMING EASILY ANNOYED OR IRRITABLE: NOT AT ALL
2. NOT BEING ABLE TO STOP OR CONTROL WORRYING: NOT AT ALL
3. WORRYING TOO MUCH ABOUT DIFFERENT THINGS: NOT AT ALL
1. FEELING NERVOUS, ANXIOUS, OR ON EDGE: SEVERAL DAYS
5. BEING SO RESTLESS THAT IT IS HARD TO SIT STILL: NOT AT ALL

## 2024-11-21 ASSESSMENT — PATIENT HEALTH QUESTIONNAIRE - PHQ9
6. FEELING BAD ABOUT YOURSELF - OR THAT YOU ARE A FAILURE OR HAVE LET YOURSELF OR YOUR FAMILY DOWN: NOT AT ALL
1. LITTLE INTEREST OR PLEASURE IN DOING THINGS: NOT AT ALL
5. POOR APPETITE OR OVEREATING: NOT AT ALL
3. TROUBLE FALLING OR STAYING ASLEEP: NEARLY EVERY DAY
8. MOVING OR SPEAKING SO SLOWLY THAT OTHER PEOPLE COULD HAVE NOTICED. OR THE OPPOSITE, BEING SO FIGETY OR RESTLESS THAT YOU HAVE BEEN MOVING AROUND A LOT MORE THAN USUAL: NOT AT ALL
SUM OF ALL RESPONSES TO PHQ QUESTIONS 1-9: 4
2. FEELING DOWN, DEPRESSED OR HOPELESS: NOT AT ALL
9. THOUGHTS THAT YOU WOULD BE BETTER OFF DEAD, OR OF HURTING YOURSELF: NOT AT ALL
7. TROUBLE CONCENTRATING ON THINGS, SUCH AS READING THE NEWSPAPER OR WATCHING TELEVISION: NOT AT ALL
SUM OF ALL RESPONSES TO PHQ QUESTIONS 1-9: 4
SUM OF ALL RESPONSES TO PHQ9 QUESTIONS 1 & 2: 0
SUM OF ALL RESPONSES TO PHQ QUESTIONS 1-9: 4
4. FEELING TIRED OR HAVING LITTLE ENERGY: SEVERAL DAYS
SUM OF ALL RESPONSES TO PHQ QUESTIONS 1-9: 4

## 2024-11-21 NOTE — PROGRESS NOTES
NEW PATIENT EVALUATION  Patient: Trya Phillip         Date: 2024   MR#: [id number]              : 1945  IDENTIFYING INFORMATION: This is a 79 y.o. old female who is a patient whom presents to clinic for initial evaluation.   CHIEF COMPLAINT: mood, anxiety, sleep  REFERRING PROVIDER: Florencia Patterson DO   HISTORY OF PRESENT ILLNESS:  Interval History:  Ongoing mood and anxiety issues. Endorses mood and anxiety are controlled with Celexa. Sleep remains at issues. Has tried Ambien, Lunesta, and Restoril since getting off Xanax that helped over many years and was given by PCP. Discussed benefits v risks of benzodiazepines. Given quality of life measures. Will restart Xanax and monitor. No issues with memory. 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   Current Symptoms  Duration: chronic  Sleep: poor  Appetite: normal  Anxiety: at times  Mood: at times  Compliance with medications: endorses  Side effects from the medications: denies  Current stressors: work, children    Memory changes/ADL's if applicable: baseline  Substance History: EtOH: denies Illicit Substances denies  Family History: denies  Social History: , no abuse hx  Lives with/Support from: lives with     Psychiatric Review of Systems:  Depressive symptoms:  Endorses: decreased mood, anhedonia, poor sleep/concentration/energy, decreased appetite, and impaired social and occupational functioning. Feelings of hopeless, helpless, and worthless.  Manic symptoms:  Denies: distinct period of abnormally and persistently elevated, expansive, or irritable mood for > 4 days associated with inflated self esteem, decreased need for sleep, pressured speech, racing thoughts, distractibility, or excessive involvement in pleasurable activities with high potential for painful consequences.  Anxiety symptoms:  Endorses: feeling keyed up, easily fatigued, difficult concentrating, sleep disturbance. At least one panic attack

## 2024-11-25 NOTE — TELEPHONE ENCOUNTER
Patient saw psychiatrist last Thursday, Hadroxyxine and Temazapam was discontinued and she was prescribed Alprazolam 1 mg to take 1.5-1 tablet.

## 2024-12-02 ENCOUNTER — PATIENT MESSAGE (OUTPATIENT)
Dept: INTERNAL MEDICINE CLINIC | Facility: CLINIC | Age: 79
End: 2024-12-02

## 2024-12-03 NOTE — TELEPHONE ENCOUNTER
Refill request on:  Meloxicam 150 mg- Take 1 tablet by mouth daily     LOV - 10/17/24  Next appt - 1/17/25    Rx pended   Thank you!

## 2024-12-03 NOTE — TELEPHONE ENCOUNTER
Patient is calling back regarding refill, she stated this was previously prescribed by Dr. Hendricks     Patient stated she thought she had enough refills left but when she went to the pharmacy they did not have anything on file    Patient woould like to know if this can be done as soon as possible

## 2024-12-04 RX ORDER — MELOXICAM 15 MG/1
15 TABLET ORAL DAILY PRN
Qty: 60 TABLET | Refills: 0 | Status: SHIPPED | OUTPATIENT
Start: 2024-12-04

## 2025-01-17 ENCOUNTER — OFFICE VISIT (OUTPATIENT)
Dept: INTERNAL MEDICINE CLINIC | Facility: CLINIC | Age: 80
End: 2025-01-17
Payer: MEDICARE

## 2025-01-17 VITALS
HEIGHT: 62 IN | SYSTOLIC BLOOD PRESSURE: 116 MMHG | TEMPERATURE: 97.6 F | HEART RATE: 82 BPM | WEIGHT: 182 LBS | BODY MASS INDEX: 33.49 KG/M2 | DIASTOLIC BLOOD PRESSURE: 65 MMHG | OXYGEN SATURATION: 96 %

## 2025-01-17 DIAGNOSIS — E03.9 PRIMARY HYPOTHYROIDISM: ICD-10-CM

## 2025-01-17 DIAGNOSIS — B37.31 VAGINAL YEAST INFECTION: ICD-10-CM

## 2025-01-17 DIAGNOSIS — K21.9 GASTROESOPHAGEAL REFLUX DISEASE, UNSPECIFIED WHETHER ESOPHAGITIS PRESENT: ICD-10-CM

## 2025-01-17 DIAGNOSIS — G43.709 CHRONIC MIGRAINE WITHOUT AURA, NOT INTRACTABLE, WITHOUT STATUS MIGRAINOSUS: ICD-10-CM

## 2025-01-17 DIAGNOSIS — E78.00 PURE HYPERCHOLESTEROLEMIA: ICD-10-CM

## 2025-01-17 DIAGNOSIS — E55.9 VITAMIN D DEFICIENCY: ICD-10-CM

## 2025-01-17 DIAGNOSIS — N89.8 VAGINAL LESION: ICD-10-CM

## 2025-01-17 DIAGNOSIS — R73.03 PREDIABETES: ICD-10-CM

## 2025-01-17 DIAGNOSIS — I10 ESSENTIAL HYPERTENSION: Primary | ICD-10-CM

## 2025-01-17 DIAGNOSIS — G89.29 CHRONIC PAIN OF BOTH SHOULDERS: ICD-10-CM

## 2025-01-17 DIAGNOSIS — M25.511 CHRONIC PAIN OF BOTH SHOULDERS: ICD-10-CM

## 2025-01-17 DIAGNOSIS — M25.512 CHRONIC PAIN OF BOTH SHOULDERS: ICD-10-CM

## 2025-01-17 PROCEDURE — 1036F TOBACCO NON-USER: CPT | Performed by: FAMILY MEDICINE

## 2025-01-17 PROCEDURE — G2211 COMPLEX E/M VISIT ADD ON: HCPCS | Performed by: FAMILY MEDICINE

## 2025-01-17 PROCEDURE — G8399 PT W/DXA RESULTS DOCUMENT: HCPCS | Performed by: FAMILY MEDICINE

## 2025-01-17 PROCEDURE — 99214 OFFICE O/P EST MOD 30 MIN: CPT | Performed by: FAMILY MEDICINE

## 2025-01-17 PROCEDURE — G8427 DOCREV CUR MEDS BY ELIG CLIN: HCPCS | Performed by: FAMILY MEDICINE

## 2025-01-17 PROCEDURE — 3078F DIAST BP <80 MM HG: CPT | Performed by: FAMILY MEDICINE

## 2025-01-17 PROCEDURE — 1123F ACP DISCUSS/DSCN MKR DOCD: CPT | Performed by: FAMILY MEDICINE

## 2025-01-17 PROCEDURE — 3074F SYST BP LT 130 MM HG: CPT | Performed by: FAMILY MEDICINE

## 2025-01-17 PROCEDURE — 1090F PRES/ABSN URINE INCON ASSESS: CPT | Performed by: FAMILY MEDICINE

## 2025-01-17 PROCEDURE — 1159F MED LIST DOCD IN RCRD: CPT | Performed by: FAMILY MEDICINE

## 2025-01-17 PROCEDURE — 1125F AMNT PAIN NOTED PAIN PRSNT: CPT | Performed by: FAMILY MEDICINE

## 2025-01-17 PROCEDURE — G8417 CALC BMI ABV UP PARAM F/U: HCPCS | Performed by: FAMILY MEDICINE

## 2025-01-17 RX ORDER — PANTOPRAZOLE SODIUM 40 MG/1
40 TABLET, DELAYED RELEASE ORAL DAILY
Qty: 90 TABLET | Refills: 4 | Status: SHIPPED | OUTPATIENT
Start: 2025-01-17

## 2025-01-17 RX ORDER — FLUCONAZOLE 150 MG/1
150 TABLET ORAL
Qty: 2 TABLET | Refills: 0 | Status: SHIPPED | OUTPATIENT
Start: 2025-01-17 | End: 2025-01-23

## 2025-01-17 RX ORDER — ATORVASTATIN CALCIUM 20 MG/1
20 TABLET, FILM COATED ORAL DAILY
Qty: 90 TABLET | Refills: 4 | Status: SHIPPED | OUTPATIENT
Start: 2025-01-17

## 2025-01-17 RX ORDER — DICYCLOMINE HCL 20 MG
20 TABLET ORAL EVERY 6 HOURS PRN
Qty: 120 TABLET | Refills: 4 | Status: SHIPPED | OUTPATIENT
Start: 2025-01-17

## 2025-01-17 RX ORDER — KETOCONAZOLE 20 MG/G
CREAM TOPICAL
Qty: 60 G | Refills: 1 | Status: SHIPPED | OUTPATIENT
Start: 2025-01-17

## 2025-01-17 RX ORDER — METOPROLOL SUCCINATE 100 MG/1
100 TABLET, EXTENDED RELEASE ORAL DAILY
Qty: 90 TABLET | Refills: 4 | Status: SHIPPED | OUTPATIENT
Start: 2025-01-17

## 2025-01-17 RX ORDER — LEVOTHYROXINE SODIUM 50 UG/1
50 TABLET ORAL
Qty: 77 TABLET | Refills: 4 | Status: SHIPPED | OUTPATIENT
Start: 2025-01-17

## 2025-01-17 RX ORDER — MELOXICAM 15 MG/1
15 TABLET ORAL DAILY PRN
Qty: 90 TABLET | Refills: 1 | Status: SHIPPED | OUTPATIENT
Start: 2025-01-17

## 2025-01-17 RX ORDER — SUMATRIPTAN SUCCINATE 100 MG/1
100 TABLET ORAL PRN
Qty: 10 TABLET | Refills: 12 | Status: SHIPPED | OUTPATIENT
Start: 2025-01-17

## 2025-01-17 SDOH — ECONOMIC STABILITY: FOOD INSECURITY: WITHIN THE PAST 12 MONTHS, THE FOOD YOU BOUGHT JUST DIDN'T LAST AND YOU DIDN'T HAVE MONEY TO GET MORE.: NEVER TRUE

## 2025-01-17 SDOH — ECONOMIC STABILITY: FOOD INSECURITY: WITHIN THE PAST 12 MONTHS, YOU WORRIED THAT YOUR FOOD WOULD RUN OUT BEFORE YOU GOT MONEY TO BUY MORE.: NEVER TRUE

## 2025-01-17 ASSESSMENT — PATIENT HEALTH QUESTIONNAIRE - PHQ9
3. TROUBLE FALLING OR STAYING ASLEEP: NEARLY EVERY DAY
SUM OF ALL RESPONSES TO PHQ QUESTIONS 1-9: 3
2. FEELING DOWN, DEPRESSED OR HOPELESS: NOT AT ALL
5. POOR APPETITE OR OVEREATING: NOT AT ALL
10. IF YOU CHECKED OFF ANY PROBLEMS, HOW DIFFICULT HAVE THESE PROBLEMS MADE IT FOR YOU TO DO YOUR WORK, TAKE CARE OF THINGS AT HOME, OR GET ALONG WITH OTHER PEOPLE: NOT DIFFICULT AT ALL
1. LITTLE INTEREST OR PLEASURE IN DOING THINGS: NOT AT ALL
6. FEELING BAD ABOUT YOURSELF - OR THAT YOU ARE A FAILURE OR HAVE LET YOURSELF OR YOUR FAMILY DOWN: NOT AT ALL
SUM OF ALL RESPONSES TO PHQ QUESTIONS 1-9: 3
4. FEELING TIRED OR HAVING LITTLE ENERGY: NOT AT ALL
7. TROUBLE CONCENTRATING ON THINGS, SUCH AS READING THE NEWSPAPER OR WATCHING TELEVISION: NOT AT ALL
SUM OF ALL RESPONSES TO PHQ9 QUESTIONS 1 & 2: 0
SUM OF ALL RESPONSES TO PHQ QUESTIONS 1-9: 3
SUM OF ALL RESPONSES TO PHQ QUESTIONS 1-9: 3
9. THOUGHTS THAT YOU WOULD BE BETTER OFF DEAD, OR OF HURTING YOURSELF: NOT AT ALL
8. MOVING OR SPEAKING SO SLOWLY THAT OTHER PEOPLE COULD HAVE NOTICED. OR THE OPPOSITE, BEING SO FIGETY OR RESTLESS THAT YOU HAVE BEEN MOVING AROUND A LOT MORE THAN USUAL: NOT AT ALL

## 2025-01-17 NOTE — PROGRESS NOTES
Florencia Patterson,   [facility] [place] [address]  Phone [phone]  Fax [phone]      Tyra Phillip (: 1945) is a 79 y.o. female, here for evaluation of the following chief complaint(s):  Follow-up and Medication Refill       ASSESSMENT/PLAN:  1. Essential hypertension  Overview:  Tolerating medication well for HTN. Achieving desired therapeutic response with   Hypertension Medications       Thiazides and Thiazide-Like Diuretics       hydroCHLOROthiazide (HYDRODIURIL) 25 MG tablet Take 1 tablet by mouth as needed       Beta Blockers Cardio-Selective       metoprolol succinate (TOPROL XL) 100 MG extended release tablet Take 1 tablet by mouth daily         --will continue. Will periodically review and adjust if needed.  Encourage home monitoring.   Orders:  -     metoprolol succinate (TOPROL XL) 100 MG extended release tablet; Take 1 tablet by mouth daily, Disp-90 tablet, R-4Normal  -     Comprehensive Metabolic Panel; Future  -     CBC with Auto Differential; Future  -     TSH; Future  -     Lipid Panel; Future  2. Pure hypercholesterolemia  Overview:  Statin: yes.  Tolerating medication well and achieving desired therapeutic response. Will continue with lipitor. Will recheck lipid levels. Encourage healthy eating and exercise as able.  Orders:  -     atorvastatin (LIPITOR) 20 MG tablet; Take 1 tablet by mouth daily, Disp-90 tablet, R-4Normal  -     Comprehensive Metabolic Panel; Future  -     CBC with Auto Differential; Future  -     TSH; Future  -     Lipid Panel; Future  3. Primary hypothyroidism  Overview:  Tolerating levothyroxine well. Will recheck TSH and make adjustments to medication dosing as indicated.  Orders:  -     levothyroxine (SYNTHROID) 50 MCG tablet; Take 1 tablet by mouth Six times weekly, Disp-77 tablet, R-4Normal  -     Comprehensive Metabolic Panel; Future  -     CBC with Auto Differential; Future  -     TSH; Future  -

## 2025-01-20 ASSESSMENT — ANXIETY QUESTIONNAIRES
6. BECOMING EASILY ANNOYED OR IRRITABLE: NOT AT ALL
IF YOU CHECKED OFF ANY PROBLEMS ON THIS QUESTIONNAIRE, HOW DIFFICULT HAVE THESE PROBLEMS MADE IT FOR YOU TO DO YOUR WORK, TAKE CARE OF THINGS AT HOME, OR GET ALONG WITH OTHER PEOPLE: NOT DIFFICULT AT ALL
3. WORRYING TOO MUCH ABOUT DIFFERENT THINGS: NOT AT ALL
IF YOU CHECKED OFF ANY PROBLEMS ON THIS QUESTIONNAIRE, HOW DIFFICULT HAVE THESE PROBLEMS MADE IT FOR YOU TO DO YOUR WORK, TAKE CARE OF THINGS AT HOME, OR GET ALONG WITH OTHER PEOPLE: NOT DIFFICULT AT ALL
1. FEELING NERVOUS, ANXIOUS, OR ON EDGE: NOT AT ALL
7. FEELING AFRAID AS IF SOMETHING AWFUL MIGHT HAPPEN: NOT AT ALL
6. BECOMING EASILY ANNOYED OR IRRITABLE: NOT AT ALL
4. TROUBLE RELAXING: NOT AT ALL
3. WORRYING TOO MUCH ABOUT DIFFERENT THINGS: NOT AT ALL
GAD7 TOTAL SCORE: 0
7. FEELING AFRAID AS IF SOMETHING AWFUL MIGHT HAPPEN: NOT AT ALL
2. NOT BEING ABLE TO STOP OR CONTROL WORRYING: NOT AT ALL
4. TROUBLE RELAXING: NOT AT ALL
5. BEING SO RESTLESS THAT IT IS HARD TO SIT STILL: NOT AT ALL
5. BEING SO RESTLESS THAT IT IS HARD TO SIT STILL: NOT AT ALL
2. NOT BEING ABLE TO STOP OR CONTROL WORRYING: NOT AT ALL
1. FEELING NERVOUS, ANXIOUS, OR ON EDGE: NOT AT ALL

## 2025-01-20 ASSESSMENT — PATIENT HEALTH QUESTIONNAIRE - PHQ9
10. IF YOU CHECKED OFF ANY PROBLEMS, HOW DIFFICULT HAVE THESE PROBLEMS MADE IT FOR YOU TO DO YOUR WORK, TAKE CARE OF THINGS AT HOME, OR GET ALONG WITH OTHER PEOPLE: NOT DIFFICULT AT ALL
SUM OF ALL RESPONSES TO PHQ QUESTIONS 1-9: 1
4. FEELING TIRED OR HAVING LITTLE ENERGY: SEVERAL DAYS
1. LITTLE INTEREST OR PLEASURE IN DOING THINGS: NOT AT ALL
5. POOR APPETITE OR OVEREATING: NOT AT ALL
10. IF YOU CHECKED OFF ANY PROBLEMS, HOW DIFFICULT HAVE THESE PROBLEMS MADE IT FOR YOU TO DO YOUR WORK, TAKE CARE OF THINGS AT HOME, OR GET ALONG WITH OTHER PEOPLE: NOT DIFFICULT AT ALL
3. TROUBLE FALLING OR STAYING ASLEEP: NOT AT ALL
SUM OF ALL RESPONSES TO PHQ9 QUESTIONS 1 & 2: 0
SUM OF ALL RESPONSES TO PHQ QUESTIONS 1-9: 1
5. POOR APPETITE OR OVEREATING: NOT AT ALL
SUM OF ALL RESPONSES TO PHQ QUESTIONS 1-9: 1
SUM OF ALL RESPONSES TO PHQ QUESTIONS 1-9: 1
9. THOUGHTS THAT YOU WOULD BE BETTER OFF DEAD, OR OF HURTING YOURSELF: NOT AT ALL
SUM OF ALL RESPONSES TO PHQ QUESTIONS 1-9: 1
2. FEELING DOWN, DEPRESSED OR HOPELESS: NOT AT ALL
8. MOVING OR SPEAKING SO SLOWLY THAT OTHER PEOPLE COULD HAVE NOTICED. OR THE OPPOSITE, BEING SO FIGETY OR RESTLESS THAT YOU HAVE BEEN MOVING AROUND A LOT MORE THAN USUAL: NOT AT ALL
1. LITTLE INTEREST OR PLEASURE IN DOING THINGS: NOT AT ALL
3. TROUBLE FALLING OR STAYING ASLEEP: NOT AT ALL
8. MOVING OR SPEAKING SO SLOWLY THAT OTHER PEOPLE COULD HAVE NOTICED. OR THE OPPOSITE - BEING SO FIDGETY OR RESTLESS THAT YOU HAVE BEEN MOVING AROUND A LOT MORE THAN USUAL: NOT AT ALL
9. THOUGHTS THAT YOU WOULD BE BETTER OFF DEAD, OR OF HURTING YOURSELF: NOT AT ALL
6. FEELING BAD ABOUT YOURSELF - OR THAT YOU ARE A FAILURE OR HAVE LET YOURSELF OR YOUR FAMILY DOWN: NOT AT ALL
6. FEELING BAD ABOUT YOURSELF - OR THAT YOU ARE A FAILURE OR HAVE LET YOURSELF OR YOUR FAMILY DOWN: NOT AT ALL
4. FEELING TIRED OR HAVING LITTLE ENERGY: SEVERAL DAYS
7. TROUBLE CONCENTRATING ON THINGS, SUCH AS READING THE NEWSPAPER OR WATCHING TELEVISION: NOT AT ALL
7. TROUBLE CONCENTRATING ON THINGS, SUCH AS READING THE NEWSPAPER OR WATCHING TELEVISION: NOT AT ALL
2. FEELING DOWN, DEPRESSED OR HOPELESS: NOT AT ALL

## 2025-01-21 ENCOUNTER — OFFICE VISIT (OUTPATIENT)
Dept: BEHAVIORAL/MENTAL HEALTH CLINIC | Facility: CLINIC | Age: 80
End: 2025-01-21
Payer: MEDICARE

## 2025-01-21 VITALS
OXYGEN SATURATION: 98 % | BODY MASS INDEX: 34.23 KG/M2 | WEIGHT: 186 LBS | SYSTOLIC BLOOD PRESSURE: 138 MMHG | HEIGHT: 62 IN | DIASTOLIC BLOOD PRESSURE: 84 MMHG | HEART RATE: 105 BPM

## 2025-01-21 DIAGNOSIS — F41.9 ANXIETY DISORDER, UNSPECIFIED TYPE: ICD-10-CM

## 2025-01-21 DIAGNOSIS — F41.8 DEPRESSION WITH ANXIETY: ICD-10-CM

## 2025-01-21 DIAGNOSIS — F43.23 ADJUSTMENT DISORDER WITH MIXED ANXIETY AND DEPRESSED MOOD: Primary | ICD-10-CM

## 2025-01-21 PROBLEM — M25.512 CHRONIC PAIN OF BOTH SHOULDERS: Status: ACTIVE | Noted: 2025-01-21

## 2025-01-21 PROBLEM — G89.29 CHRONIC PAIN OF BOTH SHOULDERS: Status: ACTIVE | Noted: 2025-01-21

## 2025-01-21 PROBLEM — M25.511 CHRONIC PAIN OF BOTH SHOULDERS: Status: ACTIVE | Noted: 2025-01-21

## 2025-01-21 PROCEDURE — 1160F RVW MEDS BY RX/DR IN RCRD: CPT | Performed by: PSYCHIATRY & NEUROLOGY

## 2025-01-21 PROCEDURE — 1159F MED LIST DOCD IN RCRD: CPT | Performed by: PSYCHIATRY & NEUROLOGY

## 2025-01-21 PROCEDURE — 1036F TOBACCO NON-USER: CPT | Performed by: PSYCHIATRY & NEUROLOGY

## 2025-01-21 PROCEDURE — G8427 DOCREV CUR MEDS BY ELIG CLIN: HCPCS | Performed by: PSYCHIATRY & NEUROLOGY

## 2025-01-21 PROCEDURE — 1090F PRES/ABSN URINE INCON ASSESS: CPT | Performed by: PSYCHIATRY & NEUROLOGY

## 2025-01-21 PROCEDURE — G8417 CALC BMI ABV UP PARAM F/U: HCPCS | Performed by: PSYCHIATRY & NEUROLOGY

## 2025-01-21 PROCEDURE — 3075F SYST BP GE 130 - 139MM HG: CPT | Performed by: PSYCHIATRY & NEUROLOGY

## 2025-01-21 PROCEDURE — 99214 OFFICE O/P EST MOD 30 MIN: CPT | Performed by: PSYCHIATRY & NEUROLOGY

## 2025-01-21 PROCEDURE — G8399 PT W/DXA RESULTS DOCUMENT: HCPCS | Performed by: PSYCHIATRY & NEUROLOGY

## 2025-01-21 PROCEDURE — 3079F DIAST BP 80-89 MM HG: CPT | Performed by: PSYCHIATRY & NEUROLOGY

## 2025-01-21 PROCEDURE — 1123F ACP DISCUSS/DSCN MKR DOCD: CPT | Performed by: PSYCHIATRY & NEUROLOGY

## 2025-01-21 RX ORDER — CITALOPRAM HYDROBROMIDE 20 MG/1
20 TABLET ORAL DAILY
Qty: 90 TABLET | Refills: 0 | Status: SHIPPED | OUTPATIENT
Start: 2025-01-21

## 2025-01-21 RX ORDER — ALPRAZOLAM 1 MG/1
TABLET ORAL
COMMUNITY
Start: 2024-12-27 | End: 2025-01-21 | Stop reason: SDUPTHER

## 2025-01-21 RX ORDER — ALPRAZOLAM 1 MG/1
.5-1 TABLET ORAL NIGHTLY PRN
Qty: 30 TABLET | Refills: 2 | Status: SHIPPED | OUTPATIENT
Start: 2025-01-21 | End: 2025-04-21

## 2025-01-21 NOTE — PROGRESS NOTES
PROGRESS NOTE  Patient: Tyar Phillip         Date: 2025   MR#: [id number]              : 1945  IDENTIFYING INFORMATION: This is a 79 y.o. old female who is a patient whom presents to clinic for follow up evaluation.   CHIEF COMPLAINT: mood, anxiety, sleep  HISTORY OF PRESENT ILLNESS:  Interval History:  Ongoing mood and anxiety issues. Endorses mood and anxiety are controlled with Celexa. Sleep has been stable with Xanax. No issues with memory. 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   Duration: chronic  Sleep: poor  Appetite: normal  Anxiety: at times  Mood: at times  Compliance with medications: endorses  Side effects from the medications: denies  Current stressors: work, children    Memory changes/ADL's if applicable: baseline  Substance History: EtOH: denies Illicit Substances denies  Family History: denies  Social History: , no abuse hx  Lives with/Support from: lives with     Review of Systems:  Constitutional: Negative for chills and fever.  HEENT: Negative for congestion.  Cardiovascular: Negative for chest pain, palpitations.  Respiratory: Negative for cough, shortness of breath, or wheezing.  GI: Negative for nausea and vomiting; constipation, diarrhea.  Psychiatric/Behavioral: See HPI  Neurological: Negative for sensory changes or tingling.  Hematological and Lymphatic: Negative for bleeding or bruising.  MSK: Negative for myalgias.    Current Active Problems:   Patient Active Problem List   Diagnosis    Mitral valve prolapse    Migraine headache    Vaginal prolapse    Stress incontinence    Chronic migraine without aura, not intractable, without status migrainosus    Menopausal and perimenopausal disorder    Pure hypercholesterolemia    Essential hypertension    Primary hypothyroidism    Vaginal atrophy    Family history of thyroid cancer    Multiple thyroid nodules    Hashimoto's thyroiditis    GERD (gastroesophageal reflux disease)    Spider telangiectasis

## 2025-02-13 ENCOUNTER — HOSPITAL ENCOUNTER (EMERGENCY)
Age: 80
Discharge: HOME | End: 2025-02-13
Payer: MEDICARE

## 2025-02-13 VITALS
TEMPERATURE: 98.9 F | RESPIRATION RATE: 16 BRPM | OXYGEN SATURATION: 98 % | SYSTOLIC BLOOD PRESSURE: 148 MMHG | HEIGHT: 62 IN | BODY MASS INDEX: 32.94 KG/M2 | WEIGHT: 179 LBS | HEART RATE: 84 BPM | DIASTOLIC BLOOD PRESSURE: 75 MMHG

## 2025-02-13 DIAGNOSIS — H66.001 NON-RECURRENT ACUTE SUPPURATIVE OTITIS MEDIA OF RIGHT EAR WITHOUT SPONTANEOUS RUPTURE OF TYMPANIC MEMBRANE: Primary | ICD-10-CM

## 2025-02-13 PROCEDURE — 99283 EMERGENCY DEPT VISIT LOW MDM: CPT

## 2025-02-13 PROCEDURE — 6370000000 HC RX 637 (ALT 250 FOR IP): Performed by: NURSE PRACTITIONER

## 2025-02-13 RX ORDER — AMOXICILLIN 875 MG/1
875 TABLET, COATED ORAL 2 TIMES DAILY
Qty: 20 TABLET | Refills: 0 | Status: SHIPPED | OUTPATIENT
Start: 2025-02-13 | End: 2025-02-23

## 2025-02-13 RX ADMIN — AMOXICILLIN AND CLAVULANATE POTASSIUM 1 TABLET: 875; 125 TABLET, FILM COATED ORAL at 18:55

## 2025-02-13 ASSESSMENT — PAIN DESCRIPTION - LOCATION: LOCATION: EAR

## 2025-02-13 ASSESSMENT — PAIN SCALES - GENERAL
PAINLEVEL_OUTOF10: 6
PAINLEVEL_OUTOF10: 6

## 2025-02-13 ASSESSMENT — PAIN - FUNCTIONAL ASSESSMENT: PAIN_FUNCTIONAL_ASSESSMENT: 0-10

## 2025-02-13 NOTE — ED PROVIDER NOTES
Emergency Department Provider Note       PCP: Florencia Patterson DO   Age: 79 y.o.   Sex: female     DISPOSITION Decision To Discharge 02/13/2025 06:54:15 PM    ICD-10-CM    1. Non-recurrent acute suppurative otitis media of right ear without spontaneous rupture of tympanic membrane  H66.001           Medical Decision Making     Overall well-appearing 79-year-old female presents emergency department today with complaint of right ear pain.  She appears in no acute distress.  Exam concerning for otitis media.  Through shared decision making we will start on antibiotics.  Encouraged follow-up with PCP for recheck.  ER return precautions discussed.     1 acute, uncomplicated illness or injury.  Shared medical decision making was utilized in creating the patients health plan today.  I independently ordered and reviewed each unique test.    I reviewed external records: provider visit note from PCP.                     History     79-year-old female presents to the emergency department today with complaint of right ear pain.  Patient states she has had congestion and sinus infection symptoms for 3 weeks.  She states that both of her ears have had pressure for the past few weeks but her right ear began hurting over the past few days.    The history is provided by the patient.     Physical Exam     Vitals signs and nursing note reviewed:  Vitals:    02/13/25 1842   BP: (!) 154/79   Pulse: 97   Resp: 19   Temp: 98.9 °F (37.2 °C)   TempSrc: Oral   SpO2: 98%   Weight: 81.2 kg (179 lb)   Height: 1.575 m (5' 2\")      Physical Exam  Vitals and nursing note reviewed.   Constitutional:       General: She is not in acute distress.     Appearance: Normal appearance. She is well-developed. She is not ill-appearing, toxic-appearing or diaphoretic.   HENT:      Head: Normocephalic and atraumatic.      Right Ear: A middle ear effusion is present. Tympanic membrane is erythematous and bulging.      Left Ear: A middle ear effusion is

## 2025-02-13 NOTE — DISCHARGE INSTRUCTIONS
Take medication as prescribed.  As we discussed, if your symptoms have resolved after 7 days, you may stop taking the antibiotic.  Continue the nasal spray and the decongestant as needed.  Return to the emergency department for any new, worsening, or concerning symptoms.

## 2025-04-18 DIAGNOSIS — K21.9 GASTROESOPHAGEAL REFLUX DISEASE, UNSPECIFIED WHETHER ESOPHAGITIS PRESENT: ICD-10-CM

## 2025-04-18 RX ORDER — DICYCLOMINE HCL 20 MG
20 TABLET ORAL EVERY 6 HOURS PRN
Qty: 360 TABLET | Refills: 1 | OUTPATIENT
Start: 2025-04-18

## 2025-04-22 ENCOUNTER — OFFICE VISIT (OUTPATIENT)
Dept: BEHAVIORAL/MENTAL HEALTH CLINIC | Facility: CLINIC | Age: 80
End: 2025-04-22
Payer: MEDICARE

## 2025-04-22 VITALS
HEART RATE: 94 BPM | WEIGHT: 179 LBS | SYSTOLIC BLOOD PRESSURE: 110 MMHG | DIASTOLIC BLOOD PRESSURE: 68 MMHG | OXYGEN SATURATION: 96 % | HEIGHT: 62 IN | BODY MASS INDEX: 32.94 KG/M2

## 2025-04-22 DIAGNOSIS — F41.8 DEPRESSION WITH ANXIETY: ICD-10-CM

## 2025-04-22 DIAGNOSIS — G47.00 INSOMNIA, UNSPECIFIED TYPE: Primary | ICD-10-CM

## 2025-04-22 DIAGNOSIS — F43.23 ADJUSTMENT DISORDER WITH MIXED ANXIETY AND DEPRESSED MOOD: ICD-10-CM

## 2025-04-22 PROCEDURE — 3078F DIAST BP <80 MM HG: CPT | Performed by: PSYCHIATRY & NEUROLOGY

## 2025-04-22 PROCEDURE — 1159F MED LIST DOCD IN RCRD: CPT | Performed by: PSYCHIATRY & NEUROLOGY

## 2025-04-22 PROCEDURE — 99214 OFFICE O/P EST MOD 30 MIN: CPT | Performed by: PSYCHIATRY & NEUROLOGY

## 2025-04-22 PROCEDURE — 1090F PRES/ABSN URINE INCON ASSESS: CPT | Performed by: PSYCHIATRY & NEUROLOGY

## 2025-04-22 PROCEDURE — G8417 CALC BMI ABV UP PARAM F/U: HCPCS | Performed by: PSYCHIATRY & NEUROLOGY

## 2025-04-22 PROCEDURE — G8427 DOCREV CUR MEDS BY ELIG CLIN: HCPCS | Performed by: PSYCHIATRY & NEUROLOGY

## 2025-04-22 PROCEDURE — 1123F ACP DISCUSS/DSCN MKR DOCD: CPT | Performed by: PSYCHIATRY & NEUROLOGY

## 2025-04-22 PROCEDURE — G8399 PT W/DXA RESULTS DOCUMENT: HCPCS | Performed by: PSYCHIATRY & NEUROLOGY

## 2025-04-22 PROCEDURE — 3074F SYST BP LT 130 MM HG: CPT | Performed by: PSYCHIATRY & NEUROLOGY

## 2025-04-22 PROCEDURE — 1036F TOBACCO NON-USER: CPT | Performed by: PSYCHIATRY & NEUROLOGY

## 2025-04-22 PROCEDURE — 1160F RVW MEDS BY RX/DR IN RCRD: CPT | Performed by: PSYCHIATRY & NEUROLOGY

## 2025-04-22 RX ORDER — ALPRAZOLAM 0.5 MG
0.5 TABLET ORAL NIGHTLY PRN
Qty: 30 TABLET | Refills: 5 | Status: SHIPPED | OUTPATIENT
Start: 2025-04-22 | End: 2025-10-19

## 2025-04-22 RX ORDER — CITALOPRAM HYDROBROMIDE 20 MG/1
20 TABLET ORAL DAILY
Qty: 90 TABLET | Refills: 1 | Status: SHIPPED | OUTPATIENT
Start: 2025-04-22

## 2025-04-22 ASSESSMENT — PATIENT HEALTH QUESTIONNAIRE - PHQ9
1. LITTLE INTEREST OR PLEASURE IN DOING THINGS: NOT AT ALL
SUM OF ALL RESPONSES TO PHQ QUESTIONS 1-9: 0
2. FEELING DOWN, DEPRESSED OR HOPELESS: NOT AT ALL

## 2025-04-22 NOTE — PROGRESS NOTES
PROGRESS NOTE  Patient: Tyra Phillip         Date: 2025   MR#: [id number]              : 1945  IDENTIFYING INFORMATION: This is a 79 y.o. old female who is a patient whom presents to clinic for follow up evaluation.   CHIEF COMPLAINT: mood, anxiety, sleep  HISTORY OF PRESENT ILLNESS:  Interval History:  Ongoing mood and anxiety issues. Endorses mood and anxiety are controlled with Celexa. Sleep has been stable with Xanax. No issues with memory. Continues to work PT in accounting. Discussed cutting back on Xanax to 0.5 mg . Has already done that and is doing well with sleeping.   Will monitor.   Duration: chronic  Sleep: fair  Appetite: normal  Anxiety: at times  Mood: at times  Compliance with medications: endorses  Side effects from the medications: denies  Current stressors: work, children    Memory changes/ADL's if applicable: baseline  Substance History: EtOH: denies Illicit Substances denies  Family History: denies  Social History: , no abuse hx  Lives with/Support from: lives with     Review of Systems:  Constitutional: Negative for chills and fever.  HEENT: Negative for congestion.  Cardiovascular: Negative for chest pain, palpitations.  Respiratory: Negative for cough, shortness of breath, or wheezing.  GI: Negative for nausea and vomiting; constipation, diarrhea.  Psychiatric/Behavioral: See HPI  Neurological: Negative for sensory changes or tingling.  Hematological and Lymphatic: Negative for bleeding or bruising.  MSK: Negative for myalgias.    Current Active Problems:   Patient Active Problem List   Diagnosis    Mitral valve prolapse    Migraine headache    Vaginal prolapse    Stress incontinence    Chronic migraine without aura, not intractable, without status migrainosus    Menopausal and perimenopausal disorder    Pure hypercholesterolemia    Essential hypertension    Primary hypothyroidism    Vaginal atrophy    Family history of thyroid cancer    Multiple thyroid

## 2025-05-08 ENCOUNTER — LAB (OUTPATIENT)
Dept: FAMILY MEDICINE CLINIC | Facility: CLINIC | Age: 80
End: 2025-05-08

## 2025-05-08 DIAGNOSIS — E55.9 VITAMIN D DEFICIENCY: ICD-10-CM

## 2025-05-08 DIAGNOSIS — E03.9 PRIMARY HYPOTHYROIDISM: ICD-10-CM

## 2025-05-08 DIAGNOSIS — I10 ESSENTIAL HYPERTENSION: ICD-10-CM

## 2025-05-08 DIAGNOSIS — R73.03 PREDIABETES: ICD-10-CM

## 2025-05-08 DIAGNOSIS — M25.511 CHRONIC PAIN OF BOTH SHOULDERS: ICD-10-CM

## 2025-05-08 DIAGNOSIS — E78.00 PURE HYPERCHOLESTEROLEMIA: ICD-10-CM

## 2025-05-08 DIAGNOSIS — G43.709 CHRONIC MIGRAINE WITHOUT AURA, NOT INTRACTABLE, WITHOUT STATUS MIGRAINOSUS: ICD-10-CM

## 2025-05-08 DIAGNOSIS — G89.29 CHRONIC PAIN OF BOTH SHOULDERS: ICD-10-CM

## 2025-05-08 DIAGNOSIS — M25.512 CHRONIC PAIN OF BOTH SHOULDERS: ICD-10-CM

## 2025-05-08 DIAGNOSIS — K21.9 GASTROESOPHAGEAL REFLUX DISEASE, UNSPECIFIED WHETHER ESOPHAGITIS PRESENT: ICD-10-CM

## 2025-05-08 LAB
25(OH)D3 SERPL-MCNC: 69.9 NG/ML (ref 30–100)
ALBUMIN SERPL-MCNC: 3.8 G/DL (ref 3.2–4.6)
ALBUMIN/GLOB SERPL: 1.3 (ref 1–1.9)
ALP SERPL-CCNC: 162 U/L (ref 35–104)
ALT SERPL-CCNC: 17 U/L (ref 8–45)
ANION GAP SERPL CALC-SCNC: 9 MMOL/L (ref 7–16)
AST SERPL-CCNC: 20 U/L (ref 15–37)
BASOPHILS # BLD: 0.07 K/UL (ref 0–0.2)
BASOPHILS NFR BLD: 0.9 % (ref 0–2)
BILIRUB SERPL-MCNC: 0.7 MG/DL (ref 0–1.2)
BUN SERPL-MCNC: 19 MG/DL (ref 8–23)
CALCIUM SERPL-MCNC: 10.1 MG/DL (ref 8.8–10.2)
CHLORIDE SERPL-SCNC: 102 MMOL/L (ref 98–107)
CHOLEST SERPL-MCNC: 168 MG/DL (ref 0–200)
CO2 SERPL-SCNC: 30 MMOL/L (ref 20–29)
CREAT SERPL-MCNC: 0.99 MG/DL (ref 0.6–1.1)
DIFFERENTIAL METHOD BLD: ABNORMAL
EOSINOPHIL # BLD: 0.27 K/UL (ref 0–0.8)
EOSINOPHIL NFR BLD: 3.6 % (ref 0.5–7.8)
ERYTHROCYTE [DISTWIDTH] IN BLOOD BY AUTOMATED COUNT: 14 % (ref 11.9–14.6)
EST. AVERAGE GLUCOSE BLD GHB EST-MCNC: 123 MG/DL
GLOBULIN SER CALC-MCNC: 3 G/DL (ref 2.3–3.5)
GLUCOSE SERPL-MCNC: 94 MG/DL (ref 70–99)
HBA1C MFR BLD: 5.9 % (ref 0–5.6)
HCT VFR BLD AUTO: 46.8 % (ref 35.8–46.3)
HDLC SERPL-MCNC: 40 MG/DL (ref 40–60)
HDLC SERPL: 4.2 (ref 0–5)
HGB BLD-MCNC: 14 G/DL (ref 11.7–15.4)
IMM GRANULOCYTES # BLD AUTO: 0.03 K/UL (ref 0–0.5)
IMM GRANULOCYTES NFR BLD AUTO: 0.4 % (ref 0–5)
LDLC SERPL CALC-MCNC: 87 MG/DL (ref 0–100)
LYMPHOCYTES # BLD: 1.25 K/UL (ref 0.5–4.6)
LYMPHOCYTES NFR BLD: 16.8 % (ref 13–44)
MCH RBC QN AUTO: 29 PG (ref 26.1–32.9)
MCHC RBC AUTO-ENTMCNC: 29.9 G/DL (ref 31.4–35)
MCV RBC AUTO: 97.1 FL (ref 82–102)
MONOCYTES # BLD: 0.67 K/UL (ref 0.1–1.3)
MONOCYTES NFR BLD: 9 % (ref 4–12)
NEUTS SEG # BLD: 5.17 K/UL (ref 1.7–8.2)
NEUTS SEG NFR BLD: 69.3 % (ref 43–78)
NRBC # BLD: 0 K/UL (ref 0–0.2)
PLATELET # BLD AUTO: 285 K/UL (ref 150–450)
PMV BLD AUTO: 10.4 FL (ref 9.4–12.3)
POTASSIUM SERPL-SCNC: 4.7 MMOL/L (ref 3.5–5.1)
PROT SERPL-MCNC: 6.8 G/DL (ref 6.3–8.2)
RBC # BLD AUTO: 4.82 M/UL (ref 4.05–5.2)
SODIUM SERPL-SCNC: 141 MMOL/L (ref 136–145)
TRIGL SERPL-MCNC: 206 MG/DL (ref 0–150)
TSH, 3RD GENERATION: 1.71 UIU/ML (ref 0.27–4.2)
VLDLC SERPL CALC-MCNC: 41 MG/DL (ref 6–23)
WBC # BLD AUTO: 7.5 K/UL (ref 4.3–11.1)

## 2025-05-15 ENCOUNTER — OFFICE VISIT (OUTPATIENT)
Dept: INTERNAL MEDICINE CLINIC | Facility: CLINIC | Age: 80
End: 2025-05-15
Payer: MEDICARE

## 2025-05-15 VITALS
DIASTOLIC BLOOD PRESSURE: 70 MMHG | TEMPERATURE: 97.2 F | HEART RATE: 86 BPM | WEIGHT: 182 LBS | BODY MASS INDEX: 33.49 KG/M2 | HEIGHT: 62 IN | OXYGEN SATURATION: 98 % | SYSTOLIC BLOOD PRESSURE: 119 MMHG

## 2025-05-15 DIAGNOSIS — E03.9 PRIMARY HYPOTHYROIDISM: ICD-10-CM

## 2025-05-15 DIAGNOSIS — M25.512 CHRONIC PAIN OF BOTH SHOULDERS: ICD-10-CM

## 2025-05-15 DIAGNOSIS — M25.511 CHRONIC PAIN OF BOTH SHOULDERS: ICD-10-CM

## 2025-05-15 DIAGNOSIS — E78.00 PURE HYPERCHOLESTEROLEMIA: ICD-10-CM

## 2025-05-15 DIAGNOSIS — G89.29 CHRONIC PAIN OF BOTH SHOULDERS: ICD-10-CM

## 2025-05-15 DIAGNOSIS — N18.31 STAGE 3A CHRONIC KIDNEY DISEASE (HCC): ICD-10-CM

## 2025-05-15 DIAGNOSIS — I10 ESSENTIAL HYPERTENSION: Primary | ICD-10-CM

## 2025-05-15 DIAGNOSIS — R73.03 PREDIABETES: ICD-10-CM

## 2025-05-15 DIAGNOSIS — E55.9 VITAMIN D DEFICIENCY: ICD-10-CM

## 2025-05-15 DIAGNOSIS — K21.9 GASTROESOPHAGEAL REFLUX DISEASE, UNSPECIFIED WHETHER ESOPHAGITIS PRESENT: ICD-10-CM

## 2025-05-15 DIAGNOSIS — G43.709 CHRONIC MIGRAINE WITHOUT AURA, NOT INTRACTABLE, WITHOUT STATUS MIGRAINOSUS: ICD-10-CM

## 2025-05-15 PROCEDURE — 1036F TOBACCO NON-USER: CPT | Performed by: FAMILY MEDICINE

## 2025-05-15 PROCEDURE — 3078F DIAST BP <80 MM HG: CPT | Performed by: FAMILY MEDICINE

## 2025-05-15 PROCEDURE — 1090F PRES/ABSN URINE INCON ASSESS: CPT | Performed by: FAMILY MEDICINE

## 2025-05-15 PROCEDURE — 3074F SYST BP LT 130 MM HG: CPT | Performed by: FAMILY MEDICINE

## 2025-05-15 PROCEDURE — 1126F AMNT PAIN NOTED NONE PRSNT: CPT | Performed by: FAMILY MEDICINE

## 2025-05-15 PROCEDURE — G8427 DOCREV CUR MEDS BY ELIG CLIN: HCPCS | Performed by: FAMILY MEDICINE

## 2025-05-15 PROCEDURE — 1159F MED LIST DOCD IN RCRD: CPT | Performed by: FAMILY MEDICINE

## 2025-05-15 PROCEDURE — 99214 OFFICE O/P EST MOD 30 MIN: CPT | Performed by: FAMILY MEDICINE

## 2025-05-15 PROCEDURE — G2211 COMPLEX E/M VISIT ADD ON: HCPCS | Performed by: FAMILY MEDICINE

## 2025-05-15 PROCEDURE — G8399 PT W/DXA RESULTS DOCUMENT: HCPCS | Performed by: FAMILY MEDICINE

## 2025-05-15 PROCEDURE — 1123F ACP DISCUSS/DSCN MKR DOCD: CPT | Performed by: FAMILY MEDICINE

## 2025-05-15 PROCEDURE — G8417 CALC BMI ABV UP PARAM F/U: HCPCS | Performed by: FAMILY MEDICINE

## 2025-05-15 RX ORDER — MELOXICAM 15 MG/1
15 TABLET ORAL DAILY PRN
Qty: 90 TABLET | Refills: 4 | Status: SHIPPED | OUTPATIENT
Start: 2025-05-15

## 2025-05-15 ASSESSMENT — PATIENT HEALTH QUESTIONNAIRE - PHQ9
SUM OF ALL RESPONSES TO PHQ QUESTIONS 1-9: 3
9. THOUGHTS THAT YOU WOULD BE BETTER OFF DEAD, OR OF HURTING YOURSELF: NOT AT ALL
7. TROUBLE CONCENTRATING ON THINGS, SUCH AS READING THE NEWSPAPER OR WATCHING TELEVISION: NOT AT ALL
SUM OF ALL RESPONSES TO PHQ QUESTIONS 1-9: 3
SUM OF ALL RESPONSES TO PHQ QUESTIONS 1-9: 3
10. IF YOU CHECKED OFF ANY PROBLEMS, HOW DIFFICULT HAVE THESE PROBLEMS MADE IT FOR YOU TO DO YOUR WORK, TAKE CARE OF THINGS AT HOME, OR GET ALONG WITH OTHER PEOPLE: NOT DIFFICULT AT ALL
8. MOVING OR SPEAKING SO SLOWLY THAT OTHER PEOPLE COULD HAVE NOTICED. OR THE OPPOSITE, BEING SO FIGETY OR RESTLESS THAT YOU HAVE BEEN MOVING AROUND A LOT MORE THAN USUAL: NOT AT ALL
2. FEELING DOWN, DEPRESSED OR HOPELESS: NOT AT ALL
1. LITTLE INTEREST OR PLEASURE IN DOING THINGS: NOT AT ALL
SUM OF ALL RESPONSES TO PHQ QUESTIONS 1-9: 3
4. FEELING TIRED OR HAVING LITTLE ENERGY: NOT AT ALL
6. FEELING BAD ABOUT YOURSELF - OR THAT YOU ARE A FAILURE OR HAVE LET YOURSELF OR YOUR FAMILY DOWN: NOT AT ALL
3. TROUBLE FALLING OR STAYING ASLEEP: NEARLY EVERY DAY
5. POOR APPETITE OR OVEREATING: NOT AT ALL

## 2025-05-15 NOTE — PROGRESS NOTES
Differential; Future  5. Gastroesophageal reflux disease, unspecified whether esophagitis present  Overview:  On PPI  Reviewed side effects, interactions, and safety precautions.   GERD instructions reviewed with patient--  1. Limit caffeine and alcohol consumption to one serving daily. May need to eliminate totally if symptoms do not resolve.  2. Avoid known triggers.  3. Elevate head of bed on 6 inch blocks.  4. Avoid eating and drinking within 2 hours of bedtime.  5. Avoid spicy and fried foods.  Orders:  -     Comprehensive Metabolic Panel; Future  -     CBC with Auto Differential; Future  6. Stage 3a chronic kidney disease (HCC)  Overview:  Multiple causes  Fluid hydration  On meloxicam  Reviewed side effects, interactions, and safety precautions  Monitor for now  Orders:  -     Comprehensive Metabolic Panel; Future  -     CBC with Auto Differential; Future  7. Prediabetes  Overview:  Diet and exercise reviewed  Orders:  -     Hemoglobin A1C; Future  8. Chronic pain of both shoulders  Overview:  On meloxicam PRN  Reviewed side effects, interactions, and safety precautions.   Follows with specialist  Orders:  -     meloxicam (MOBIC) 15 MG tablet; Take 1 tablet by mouth daily as needed for Pain, Disp-90 tablet, R-4Normal  9. Vitamin D deficiency  -     Vitamin D 25 Hydroxy; Future    Labs reviewed with patient.  All questions and concerns answered. Patient voiced understanding and agrees with POC.    FOLLOW UP:  Return in about 6 months (around 11/15/2025) for Medicare Wellness, lab appointment prior.    SUBJECTIVE/OBJECTIVE:  HPI: Patient is a 78 yo female who presents today for follow up.  HTN: Home BP Monitoring: normal. taking medications as instructed, no medication side effects noted.  Hyperlipidemia:  Taking medication as directed. No muscle aches or significant myalgias. No other side effects from the medication.    Has hypothyroidism and reports no new problems.  She  denies fatigue, weight changes,

## (undated) DEVICE — KENDALL RADIOLUCENT FOAM MONITORING ELECTRODE RECTANGULAR SHAPE: Brand: KENDALL

## (undated) DEVICE — CONNECTOR TBNG OD5-7MM O2 END DISP

## (undated) DEVICE — CANNULA NSL ORAL AD FOR CAPNOFLEX CO2 O2 AIRLFE

## (undated) DEVICE — CONTAINER SPEC FRMLN 120ML --

## (undated) DEVICE — BLOCK BITE AD 60FR W/ VELC STRP ADDRESSES MOST PT AND

## (undated) DEVICE — SYR 5ML 1/5 GRAD LL NSAF LF --

## (undated) DEVICE — NDL PRT INJ NSAF BLNT 18GX1.5 --

## (undated) DEVICE — SYR 3ML LL TIP 1/10ML GRAD --

## (undated) DEVICE — FORCEPS BX L240CM JAW DIA2.8MM L CAP W/ NDL MIC MESH TOOTH